# Patient Record
Sex: FEMALE | Race: BLACK OR AFRICAN AMERICAN | NOT HISPANIC OR LATINO | Employment: UNEMPLOYED | URBAN - METROPOLITAN AREA
[De-identification: names, ages, dates, MRNs, and addresses within clinical notes are randomized per-mention and may not be internally consistent; named-entity substitution may affect disease eponyms.]

---

## 2017-05-09 ENCOUNTER — HOSPITAL ENCOUNTER (EMERGENCY)
Facility: HOSPITAL | Age: 22
Discharge: HOME | End: 2017-05-09
Attending: EMERGENCY MEDICINE
Payer: MEDICAID

## 2017-05-09 VITALS
TEMPERATURE: 99 F | SYSTOLIC BLOOD PRESSURE: 104 MMHG | RESPIRATION RATE: 20 BRPM | DIASTOLIC BLOOD PRESSURE: 62 MMHG | OXYGEN SATURATION: 99 % | HEART RATE: 86 BPM

## 2017-05-09 DIAGNOSIS — K52.9 GASTROENTERITIS: Primary | ICD-10-CM

## 2017-05-09 LAB
B-HCG UR QL: NEGATIVE
BILIRUB UR QL STRIP: NEGATIVE
CLARITY UR REFRACT.AUTO: CLEAR
COLOR UR AUTO: YELLOW
GLUCOSE UR QL STRIP: NEGATIVE
HGB UR QL STRIP: NEGATIVE
KETONES UR QL STRIP: NEGATIVE
LEUKOCYTE ESTERASE UR QL STRIP: NEGATIVE
NITRITE UR QL STRIP: NEGATIVE
PH UR STRIP: 8 [PH] (ref 5–8)
PROT UR QL STRIP: NEGATIVE
SP GR UR STRIP: 1.01 (ref 1–1.03)
URN SPEC COLLECT METH UR: NORMAL
UROBILINOGEN UR STRIP-ACNC: 1 EU/DL

## 2017-05-09 PROCEDURE — 81003 URINALYSIS AUTO W/O SCOPE: CPT

## 2017-05-09 PROCEDURE — 99283 EMERGENCY DEPT VISIT LOW MDM: CPT

## 2017-05-09 PROCEDURE — 81025 URINE PREGNANCY TEST: CPT

## 2017-05-09 RX ORDER — ONDANSETRON 4 MG/1
4 TABLET, ORALLY DISINTEGRATING ORAL EVERY 6 HOURS PRN
Qty: 12 TABLET | Refills: 0 | Status: SHIPPED | OUTPATIENT
Start: 2017-05-09 | End: 2018-05-19

## 2017-05-09 NOTE — ED AVS SNAPSHOT
[facility]  [address]  [place] [address]               Asher Brunner   2017  8:06 PM   ED    Description:  Female : 1995   Department:  [facility]           Your Care was Coordinated By:     Provider Role From To    Morena Joseph MD Attending Provider 17 --      Reason for Visit     Abdominal Cramping           Diagnoses this Visit        Comments    Gastroenteritis    -  Primary       ED Disposition     ED Disposition Condition Comment    Discharge             To Do List           Follow-up Information     Follow up with Primary Doctor No In 1 week(s).       These Medications        Disp Refills Start End    ondansetron (ZOFRAN-ODT) 4 MG TbDL 12 tablet 0 2017     Take 1 tablet (4 mg total) by mouth every 6 (six) hours as needed. - Oral      [facility]     [facility] -  Assistance  Unless otherwise directed by your provider, please contact [facility], our nurse care line that is available for  assistance.     Registered nurses in the [facility] provide: appointment scheduling, clinical advisement, health education, and other advisory services.  Call: [phone] (toll free)               Medications           Message regarding Medications     Verify the changes and/or additions to your medication regime listed below are the same as discussed with your clinician today.  If any of these changes or additions are incorrect, please notify your healthcare provider.        START taking these NEW medications        Refills    ondansetron (ZOFRAN-ODT) 4 MG TbDL 0    Sig: Take 1 tablet (4 mg total) by mouth every 6 (six) hours as needed.    Class: Print    Route: Oral           Verify that the below list of medications is an accurate representation of the medications you are currently taking.  If none reported, the list may be blank. If incorrect, please contact your  healthcare provider. Carry this list with you in case of emergency.           Current Medications     alprazolam (XANAX) 0.25 MG tablet Take 1 tablet (0.25 mg total) by mouth 3 (three) times daily as needed for Anxiety.    ondansetron (ZOFRAN-ODT) 4 MG TbDL Take 1 tablet (4 mg total) by mouth every 6 (six) hours as needed.           Clinical Reference Information           Your Vitals Were     BP Pulse Temp Resp Last Period SpO2    104/62 (BP Location: Right arm, Patient Position: Sitting, BP Method: Automatic) 86 98.5 °F (36.9 °C) (Oral) 20 05/07/2017 (Exact Date) 99%      Allergies as of 5/9/2017        Reactions    Codeine Rash      Immunizations Administered on Date of Encounter - 5/9/2017     None      ED Micro, Lab, POCT     Start Ordered       Status Ordering Provider    05/09/17 2017 05/09/17 2016  Urinalysis  STAT      Final result     05/09/17 2017 05/09/17 2016  UPT (Pregnancy, urine rapid)  STAT      Final result       ED Imaging Orders     None        Discharge Instructions           Viral Gastroenteritis (Adult)    Gastroenteritis is commonly called the stomach flu. It is most often caused by a virus that affects the stomach and intestinal tract and usually lasts from 2 to 7 days. Common viruses causing gastroenteritis include norovirus, rotavirus, and hepatitis A. Non-viral causes of gastroenteritis include bacteria, parasites, and toxins.  The danger from repeated vomiting or diarrhea is dehydration. This is the loss of too much fluid from the body. When this occurs, body fluids must be replaced. Antibiotics do not help with this illness because it is usually viral.Simple home treatment will be helpful.  Symptoms of viral gastroenteritis may include:  · Watery, loose stools  · Stomach pain or abdominal cramps  · Fever and chills  · Nausea and vomiting  · Loss of bowel control  · Headache  Home care  Gastroenteritis is transmitted by contact with the stool or vomit of an infected person. This can  occur from person to person or from contact with a contaminated surface.  Follow these guidelines when caring for yourself at home:  · If symptoms are severe, rest at home for the next 24 hours or until you are feeling better.  · Wash your hands with soap and water or use alcohol-based  to prevent the spread of infection. Wash your hands after touching anyone who is sick.  · Wash your hands or use alcohol-based  after using the toilet and before meals. Clean the toilet after each use.  Remember these tips when preparing food:  · People with diarrhea should not prepare or serve food to others. When preparing foods, wash your hands before and after.  · Wash your hands after using cutting boards, countertops, knives, or utensils that have been in contact with raw food.  · Keep uncooked meats away from cooked and ready-to-eat foods.  Medicine  You may use acetaminophen or NSAID medicines like ibuprofen or naproxen to control fever unless another medicine was given. If you have chronic liver or kidney disease, talk with your healthcare provider before using these medicines. Also talk with your provider if you've had a stomach ulcer or gastrointestinal bleeding. Don't give aspirin to anyone under 18 years of age who is ill with a fever. It may cause severe liver damage. Don't use NSAIDS is you are already taking one for another condition (like arthritis) or are on aspirin (such as for heart disease or after a stroke).  If medicine for vomiting or diarrhea are prescribed, take these only as directed. Do not take over-the-counter medicines for vomiting or diarrhea unless instructed by your healthcare provider.  Diet  Follow these guidelines for food:  · Water and liquids are important so you don't get dehydrated. Drink a small amount at a time or suck on ice chips if you are vomiting.  · If you eat, avoid fatty, greasy, spicy, or fried foods.  · Don't eat dairy if you have diarrhea. This can make diarrhea  worse.  · Avoid tobacco, alcohol, and caffeine which may worsen symptoms.  During the first 24 hours (the first full day), follow the diet below:  · Beverages. Sports drinks, soft drinks without caffeine, ginger ale, mineral water (plain or flavored), decaffeinated tea and coffee. If you are very dehydrated, sports drinks aren't a good choice. They have too much sugar and not enough electrolytes. In this case, commercially available products called oral rehydration solutions, are best.  · Soups. Eat clear broth, consommé, and bouillon.  · Desserts. Eat gelatin, popsicles, and fruit juice bars.  During the next 24 hours (the second day), you may add the following to the above:  · Hot cereal, plain toast, bread, rolls, and crackers  · Plain noodles, rice, mashed potatoes, chicken noodle or rice soup  · Unsweetened canned fruit (avoid pineapple), bananas  · Limit fat intake to less than 15 grams per day. Do this by avoiding margarine, butter, oils, mayonnaise, sauces, gravies, fried foods, peanut butter, meat, poultry, and fish.  · Limit fiber and avoid raw or cooked vegetables, fresh fruits (except bananas), and bran cereals.  · Limit caffeine and chocolate. Don't use spices or seasonings other than salt.  · Limit dairy products.  · Avoid alcohol.  During the next 24 hours:  · Gradually resume a normal diet as you feel better and your symptoms improve.  · If at any time it starts getting worse again, go back to clear liquids until you feel better.  Follow-up care  Follow up with your healthcare provider, or as advised. Call your provider if you don't get better within 24 hours or if diarrhea lasts more than a week. Also follow up if you are unable to keep down liquids and get dehydrated. If a stool (diarrhea) sample was taken, call as directed for the results.  Call 911  Call 911 if any of these occur:  · Trouble breathing  · Chest pain  · Confused  · Severe drowsiness or trouble awakening  · Fainting or loss of  consciousness  · Rapid heart rate  · Seizure  · Stiff neck  When to seek medical advice  Call your healthcare provider right away if any of these occur:  · Abdominal pain that gets worse  · Continued vomiting (unable to keep liquids down)  · Frequent diarrhea (more than 5 times a day)  · Blood in vomit or stool (black or red color)  · Dark urine, reduced urine output, or extreme thirst  · Weakness or dizziness  · Drowsiness  · Fever of 100.4°F (38°C) oral or higher that does not get better with fever medicine  · New rash  Date Last Reviewed: 1/3/2016  © 7757-0250 [employer]. [address] [place] [address], [place] [address]. All rights reserved. This information is not intended as a substitute for professional medical care. Always follow your healthcare professional's instructions.           [facility] complies with applicable Federal civil rights laws and does not discriminate on the basis of race, color, national origin, age, disability, or sex.        Language Assistance Services     ATTENTION: Language assistance services are available, free of charge. Please call [phone].      ATENCIÓN: Si habla español, tiene a hernandez disposición servicios gratuitos de asistencia lingüística. Llame al [phone].     MAYCO Ý: N?u b?n nói Ti?ng Vi?t, có các d?ch v? h? tr? ngôn ng? mi?n phí josephh cho b?n. G?i s? [phone].

## 2017-05-10 NOTE — ED TRIAGE NOTES
"Pt states "it seems like a virus but I don't know" pt reports cramping in RUQ x 1 day. Denies N/V/D.  "

## 2017-05-10 NOTE — ED PROVIDER NOTES
"Encounter Date: 5/9/2017       History     Chief Complaint   Patient presents with    Abdominal Cramping     "it seems like a virus but I don't know" pt reports cramping in RUQ x 1 day. Denies N/V/D.     Review of patient's allergies indicates:   Allergen Reactions    Codeine Rash     Patient is a 21 y.o. female presenting with the following complaint: cramps. The history is provided by the patient.   Abdominal Cramping   The primary symptoms of the illness include abdominal pain. The current episode started yesterday. The onset of the illness was gradual.   The abdominal pain began yesterday. The pain came on gradually. The abdominal pain has been unchanged since its onset. The abdominal pain is located in the RLQ. The abdominal pain does not radiate. The severity of the abdominal pain is 2/10. The abdominal pain is relieved by nothing.   The patient states that she believes she is currently not pregnant. The patient has had a change in bowel habit. Symptoms associated with the illness do not include chills, anorexia, diaphoresis, heartburn or constipation. Associated symptoms comments: diarrhea.     History reviewed. No pertinent past medical history.  History reviewed. No pertinent surgical history.  No family history on file.  Social History   Substance Use Topics    Smoking status: Never Smoker    Smokeless tobacco: None    Alcohol use No     Review of Systems   Constitutional: Negative for chills and diaphoresis.   Gastrointestinal: Positive for abdominal pain. Negative for anorexia, constipation and heartburn.   All other systems reviewed and are negative.      Physical Exam   Initial Vitals   BP Pulse Resp Temp SpO2   05/09/17 2013 05/09/17 2013 05/09/17 2013 05/09/17 2013 05/09/17 2013   104/62 86 20 98.5 °F (36.9 °C) 99 %     Physical Exam    Nursing note and vitals reviewed.  Constitutional: She appears well-developed and well-nourished.   HENT:   Head: Normocephalic and atraumatic.   Eyes: EOM are " normal.   Neck: Normal range of motion. Neck supple.   Cardiovascular: Normal rate, regular rhythm, normal heart sounds and intact distal pulses.   Pulmonary/Chest: Breath sounds normal.   Abdominal: Soft. There is no tenderness. There is no rigidity, no rebound, no guarding, no CVA tenderness, no tenderness at McBurney's point and negative Bailon's sign.   Musculoskeletal: Normal range of motion.   Neurological: She is alert and oriented to person, place, and time.   Skin: Skin is warm and dry.   Psychiatric: She has a normal mood and affect. Her behavior is normal. Judgment and thought content normal.         ED Course   Procedures  Labs Reviewed   URINALYSIS   PREGNANCY TEST, URINE RAPID             Medical Decision Making:   Clinical Tests:   Lab Tests: Ordered and Reviewed                   ED Course     Clinical Impression:   The encounter diagnosis was Gastroenteritis.    Disposition:   Disposition: Discharged  Condition: Stable       Morena Joseph MD  05/09/17 9465

## 2017-05-10 NOTE — DISCHARGE INSTRUCTIONS

## 2017-08-07 ENCOUNTER — HOSPITAL ENCOUNTER (EMERGENCY)
Facility: HOSPITAL | Age: 22
Discharge: HOME | End: 2017-08-07
Attending: EMERGENCY MEDICINE
Payer: MEDICAID

## 2017-08-07 VITALS
OXYGEN SATURATION: 100 % | WEIGHT: 105 LBS | SYSTOLIC BLOOD PRESSURE: 111 MMHG | DIASTOLIC BLOOD PRESSURE: 60 MMHG | RESPIRATION RATE: 18 BRPM | TEMPERATURE: 99 F | BODY MASS INDEX: 16.48 KG/M2 | HEART RATE: 70 BPM | HEIGHT: 67 IN

## 2017-08-07 DIAGNOSIS — N30.00 ACUTE CYSTITIS WITHOUT HEMATURIA: Primary | ICD-10-CM

## 2017-08-07 LAB
B-HCG UR QL: NEGATIVE
BACTERIA #/AREA URNS AUTO: ABNORMAL /HPF
BILIRUB UR QL STRIP: NEGATIVE
CLARITY UR REFRACT.AUTO: ABNORMAL
COLOR UR AUTO: YELLOW
CTP QC/QA: YES
GLUCOSE UR QL STRIP: NEGATIVE
HGB UR QL STRIP: ABNORMAL
HYALINE CASTS UR QL AUTO: 0 /LPF
KETONES UR QL STRIP: ABNORMAL
LEUKOCYTE ESTERASE UR QL STRIP: ABNORMAL
MICROSCOPIC COMMENT: ABNORMAL
NITRITE UR QL STRIP: NEGATIVE
PH UR STRIP: 7 [PH] (ref 5–8)
PROT UR QL STRIP: ABNORMAL
RBC #/AREA URNS AUTO: 20 /HPF (ref 0–4)
SP GR UR STRIP: 1.02 (ref 1–1.03)
SQUAMOUS #/AREA URNS AUTO: 1 /HPF
URN SPEC COLLECT METH UR: ABNORMAL
UROBILINOGEN UR STRIP-ACNC: NEGATIVE EU/DL
WBC #/AREA URNS AUTO: >100 /HPF (ref 0–5)

## 2017-08-07 PROCEDURE — 99283 EMERGENCY DEPT VISIT LOW MDM: CPT | Mod: 25

## 2017-08-07 PROCEDURE — 87077 CULTURE AEROBIC IDENTIFY: CPT

## 2017-08-07 PROCEDURE — 87186 SC STD MICRODIL/AGAR DIL: CPT

## 2017-08-07 PROCEDURE — 87086 URINE CULTURE/COLONY COUNT: CPT

## 2017-08-07 PROCEDURE — 81001 URINALYSIS AUTO W/SCOPE: CPT

## 2017-08-07 PROCEDURE — 87088 URINE BACTERIA CULTURE: CPT

## 2017-08-07 PROCEDURE — 81025 URINE PREGNANCY TEST: CPT | Performed by: EMERGENCY MEDICINE

## 2017-08-07 PROCEDURE — 99282 EMERGENCY DEPT VISIT SF MDM: CPT | Mod: ,,, | Performed by: EMERGENCY MEDICINE

## 2017-08-07 RX ORDER — SULFAMETHOXAZOLE AND TRIMETHOPRIM 800; 160 MG/1; MG/1
1 TABLET ORAL 2 TIMES DAILY
Qty: 10 TABLET | Refills: 0 | Status: SHIPPED | OUTPATIENT
Start: 2017-08-07 | End: 2017-08-12

## 2017-08-07 NOTE — ED PROVIDER NOTES
Encounter Date: 8/7/2017    SCRIBE #1 NOTE: IQuang, am scribing for, and in the presence of, Dr. Escobar.       History     Chief Complaint   Patient presents with    Dysuria     x 3 days      Time seen by provider: 2:05 AM    This is a 21 y.o. female no PMH who presents with complaint of dysuria x 3 days. She denies fever, vomiting, flank pain. She states feeling like she is not completely voiding when urinating.       The history is provided by the patient.     Review of patient's allergies indicates:   Allergen Reactions    Codeine Rash     History reviewed. No pertinent past medical history.  No past surgical history on file.  No family history on file.  Social History   Substance Use Topics    Smoking status: Never Smoker    Smokeless tobacco: Not on file    Alcohol use No     Review of Systems   Constitutional: Negative for fever.   HENT: Negative for sore throat.    Respiratory: Negative for shortness of breath.    Cardiovascular: Negative for chest pain.   Gastrointestinal: Negative for nausea.   Genitourinary: Positive for dysuria.   Musculoskeletal: Negative for back pain.   Skin: Negative for rash.   Neurological: Negative for weakness.   Hematological: Does not bruise/bleed easily.       Physical Exam     Initial Vitals [08/07/17 0035]   BP Pulse Resp Temp SpO2   111/60 70 18 98.6 °F (37 °C) 100 %      MAP       77         Physical Exam    Nursing note and vitals reviewed.  Constitutional: She appears well-developed and well-nourished. She is not diaphoretic. No distress.   HENT:   Head: Normocephalic and atraumatic.   Mouth/Throat: Oropharynx is clear and moist.   Eyes: Conjunctivae and EOM are normal. Pupils are equal, round, and reactive to light.   Neck: Normal range of motion. Neck supple. No JVD present.   Cardiovascular: Normal rate, regular rhythm and normal heart sounds.   No murmur heard.  Pulmonary/Chest: Breath sounds normal. No respiratory distress. She has no wheezes. She has no  rhonchi. She has no rales.   Abdominal: Soft. Bowel sounds are normal. She exhibits no distension and no mass. There is no tenderness. There is no rebound and no guarding.   Musculoskeletal: Normal range of motion. She exhibits no edema or tenderness.   Neurological: She is alert and oriented to person, place, and time. She has normal strength. No cranial nerve deficit or sensory deficit.   Skin: Skin is warm and dry. No rash noted.   Psychiatric: She has a normal mood and affect. Thought content normal.         ED Course   Procedures  Labs Reviewed   URINALYSIS, REFLEX TO URINE CULTURE - Abnormal; Notable for the following:        Result Value    Appearance, UA Hazy (*)     Protein, UA 1+ (*)     Ketones, UA 1+ (*)     Occult Blood UA 1+ (*)     Leukocytes, UA 3+ (*)     All other components within normal limits    Narrative:     Preferred Collection Type->Urine, Clean Catch   URINALYSIS MICROSCOPIC - Abnormal; Notable for the following:     RBC, UA 20 (*)     WBC, UA >100 (*)     Bacteria, UA Few (*)     All other components within normal limits    Narrative:     Preferred Collection Type->Urine, Clean Catch   CULTURE, URINE   POCT URINE PREGNANCY             Medical Decision Making:   History:   Old Medical Records: I decided to obtain old medical records.  Initial Assessment:   Pt with dysuria, likely UTI. Will check UA and UPT.             Scribe Attestation:   Scribe #1: I performed the above scribed service and the documentation accurately describes the services I performed. I attest to the accuracy of the note.    Attending Attestation:           Physician Attestation for Scribe:  Physician Attestation Statement for Scribe #1: I, Dr. Escobar, reviewed documentation, as scribed by Quang Flores in my presence, and it is both accurate and complete.                 ED Course     Clinical Impression:   The encounter diagnosis was Acute cystitis without hematuria.                           Abhinav Escobar,  MD  08/07/17 0549

## 2017-08-07 NOTE — ED TRIAGE NOTES
Asher Brunner, a 21 y.o. female presents to the ED c/o burning with urination and hematuria x 2 days.      Chief Complaint   Patient presents with    Dysuria     x 3 days      Review of patient's allergies indicates:   Allergen Reactions    Codeine Rash     History reviewed. No pertinent past medical history.     Adult Physical Assessment  LOC: Asher Brunner, 21 y.o. female verified via two identifiers.  The patient is awake, alert, oriented and speaking appropriately at this time.  APPEARANCE: Patient resting comfortably and appears to be in no acute distress at this time. Patient is clean and well groomed, patient's clothing is properly fastened.  SKIN:The skin is warm and dry, color consistent with ethnicity, patient has normal skin turgor and moist mucus membranes, skin intact, no breakdown or brusing noted.  MUSCULOSKELETAL: Patient moving all extremities well, no obvious swelling or deformities noted.  RESPIRATORY: Airway is open and patent, respirations are spontaneous, patient has a normal effort and rate, no accessory muscle use noted.  CARDIAC: Patient has a normal rate and rhythm, no periphreal edema noted in any extremity, capillary refill < 3 seconds in all extremities  ABDOMEN: Soft and non tender to palpation, no abdominal distention noted. Bowel sounds present in all four quadrants.  NEUROLOGIC: Eyes open spontaneously, behavior appropriate to situation, follows commands, facial expression symmetrical, bilateral hand grasp equal and even, purposeful motor response noted, normal sensation in all extremities when touched with a finger.

## 2017-08-09 LAB — BACTERIA UR CULT: NORMAL

## 2018-01-23 ENCOUNTER — HOSPITAL ENCOUNTER (EMERGENCY)
Facility: HOSPITAL | Age: 23
Discharge: HOME | End: 2018-01-24
Attending: EMERGENCY MEDICINE
Payer: MEDICAID

## 2018-01-23 VITALS
DIASTOLIC BLOOD PRESSURE: 74 MMHG | RESPIRATION RATE: 20 BRPM | HEART RATE: 72 BPM | OXYGEN SATURATION: 98 % | BODY MASS INDEX: 16.45 KG/M2 | SYSTOLIC BLOOD PRESSURE: 121 MMHG | TEMPERATURE: 98 F | WEIGHT: 105 LBS

## 2018-01-23 DIAGNOSIS — N30.01 ACUTE CYSTITIS WITH HEMATURIA: Primary | ICD-10-CM

## 2018-01-23 LAB
B-HCG UR QL: NEGATIVE
BILIRUB UR QL STRIP: NEGATIVE
CLARITY UR REFRACT.AUTO: ABNORMAL
COLOR UR AUTO: ABNORMAL
GLUCOSE UR QL STRIP: NEGATIVE
HGB UR QL STRIP: NEGATIVE
KETONES UR QL STRIP: NEGATIVE
LEUKOCYTE ESTERASE UR QL STRIP: ABNORMAL
NITRITE UR QL STRIP: NEGATIVE
PH UR STRIP: 7 [PH] (ref 5–8)
PROT UR QL STRIP: ABNORMAL
SP GR UR STRIP: 1.02 (ref 1–1.03)
URN SPEC COLLECT METH UR: ABNORMAL
UROBILINOGEN UR STRIP-ACNC: 1 EU/DL

## 2018-01-23 PROCEDURE — 99283 EMERGENCY DEPT VISIT LOW MDM: CPT

## 2018-01-23 PROCEDURE — 81000 URINALYSIS NONAUTO W/SCOPE: CPT

## 2018-01-23 PROCEDURE — 81025 URINE PREGNANCY TEST: CPT

## 2018-01-24 LAB
AMORPH CRY UR QL COMP ASSIST: ABNORMAL
BACTERIA #/AREA URNS AUTO: ABNORMAL /HPF
HYALINE CASTS UR QL AUTO: 1 /LPF
MICROSCOPIC COMMENT: ABNORMAL
RBC #/AREA URNS AUTO: 25 /HPF (ref 0–4)
SQUAMOUS #/AREA URNS AUTO: ABNORMAL /HPF
WBC #/AREA URNS AUTO: 45 /HPF (ref 0–5)

## 2018-01-24 RX ORDER — NITROFURANTOIN 25; 75 MG/1; MG/1
100 CAPSULE ORAL 2 TIMES DAILY
Qty: 20 CAPSULE | Refills: 0 | Status: SHIPPED | OUTPATIENT
Start: 2018-01-24 | End: 2018-02-03

## 2018-01-24 NOTE — ED PROVIDER NOTES
Encounter Date: 1/23/2018       History     Chief Complaint   Patient presents with    Abdominal Pain     abd for 4 days.     The history is provided by the patient.   Abdominal Pain   The current episode started yesterday. The onset of the illness was gradual. The abdominal pain is located in the suprapubic region. The abdominal pain does not radiate. The severity of the abdominal pain is 3/10. The abdominal pain is relieved by nothing.     Review of patient's allergies indicates:   Allergen Reactions    Codeine Rash     History reviewed. No pertinent past medical history.  History reviewed. No pertinent surgical history.  History reviewed. No pertinent family history.  Social History   Substance Use Topics    Smoking status: Never Smoker    Smokeless tobacco: Never Used    Alcohol use No     Review of Systems   Gastrointestinal: Positive for abdominal pain.   All other systems reviewed and are negative.      Physical Exam     Initial Vitals [01/23/18 2342]   BP Pulse Resp Temp SpO2   121/74 72 20 98.2 °F (36.8 °C) 98 %      MAP       89.67         Physical Exam    Nursing note and vitals reviewed.  Constitutional: She appears well-developed and well-nourished.   HENT:   Head: Normocephalic and atraumatic.   Eyes: EOM are normal.   Neck: Normal range of motion. Neck supple.   Cardiovascular: Normal rate, regular rhythm, normal heart sounds and intact distal pulses.   Pulmonary/Chest: Breath sounds normal.   Abdominal: Soft. She exhibits no distension. There is no tenderness. There is no rigidity, no rebound, no guarding, no CVA tenderness, no tenderness at McBurney's point and negative Bailon's sign.   Musculoskeletal: Normal range of motion.   Neurological: She is alert and oriented to person, place, and time.   Skin: Skin is warm and dry. Capillary refill takes less than 2 seconds.   Psychiatric: She has a normal mood and affect. Her behavior is normal. Judgment and thought content normal.         ED Course    Procedures  Labs Reviewed   URINALYSIS - Abnormal; Notable for the following:        Result Value    Appearance, UA Hazy (*)     Protein, UA 1+ (*)     Leukocytes, UA 3+ (*)     All other components within normal limits   URINALYSIS MICROSCOPIC - Abnormal; Notable for the following:     RBC, UA 25 (*)     WBC, UA 45 (*)     Bacteria, UA Moderate (*)     All other components within normal limits   PREGNANCY TEST, URINE RAPID             Medical Decision Making:   Clinical Tests:   Lab Tests: Ordered and Reviewed                   ED Course      Clinical Impression:   The encounter diagnosis was Acute cystitis with hematuria.    Disposition:   Disposition: Discharged  Condition: Stable                        Morena Joseph MD  01/24/18 0039

## 2018-03-25 ENCOUNTER — HOSPITAL ENCOUNTER (EMERGENCY)
Facility: HOSPITAL | Age: 23
Discharge: HOME | End: 2018-03-26
Attending: EMERGENCY MEDICINE
Payer: MEDICAID

## 2018-03-25 VITALS
TEMPERATURE: 98 F | OXYGEN SATURATION: 99 % | HEIGHT: 67 IN | SYSTOLIC BLOOD PRESSURE: 97 MMHG | RESPIRATION RATE: 18 BRPM | DIASTOLIC BLOOD PRESSURE: 55 MMHG | BODY MASS INDEX: 17.27 KG/M2 | HEART RATE: 73 BPM | WEIGHT: 110 LBS

## 2018-03-25 DIAGNOSIS — J06.9 UPPER RESPIRATORY TRACT INFECTION, UNSPECIFIED TYPE: Primary | ICD-10-CM

## 2018-03-25 PROCEDURE — 99283 EMERGENCY DEPT VISIT LOW MDM: CPT

## 2018-03-26 RX ORDER — BENZONATATE 100 MG/1
100 CAPSULE ORAL 3 TIMES DAILY PRN
Qty: 20 CAPSULE | Refills: 0 | Status: SHIPPED | OUTPATIENT
Start: 2018-03-26 | End: 2018-04-05

## 2018-03-26 NOTE — ED NOTES
Pt identifiers Asher Brunner checked and correct    LOC: The patient is awake, alert, aware of environment with an appropriate affect. Oriented x3, speaking appropriately  APPEARANCE: Pt resting comfortably, in no acute distress, pt is clean and well groomed, clothing properly fastened  SKIN: Skin warm, dry and intact, normal skin turgor, moist mucus membranes  RESPIRATORY: Airway is open and patent, respirations are spontaneous, even and unlabored, normal effort and rate. Pt c/o productive cough with yellow sputum, congestion and runny nose. Denies fevers. Clear breath sounds bilaterally throughout.   CARDIAC: Normal rate and rhythm, no peripheral edema noted, capillary refill < 3 seconds, bilateral radial pulses 2+  ABDOMEN: Soft, nontender, nondistended. Bowel sounds present   NEUROLOGIC: PERRL, facial expression is symmetrical, patient moving all extremities spontaneously, normal sensation in all extremities when touched with a finger.  Follows all commands appropriately  MUSCULOSKELETAL: No obvious deformities.

## 2018-03-26 NOTE — ED PROVIDER NOTES
Encounter Date: 3/25/2018       History     Chief Complaint   Patient presents with    Cough     Cough, congestion and rhinitis x 3 days, no other symptoms.      HPI   21 yo female who presents with cough, congestion, rhinorrhea, headaches over the last 3 days. Younger sibling with similar symptoms. Patient requesting work excuse. No other medical complaints at this time.   Review of patient's allergies indicates:   Allergen Reactions    Codeine Rash     History reviewed. No pertinent past medical history.  History reviewed. No pertinent surgical history.  No family history on file.  Social History   Substance Use Topics    Smoking status: Never Smoker    Smokeless tobacco: Never Used    Alcohol use No     Review of Systems   Constitutional: Negative for fever.   HENT: Positive for congestion and rhinorrhea.    Eyes: Negative for visual disturbance.   Respiratory: Negative for shortness of breath.    Cardiovascular: Negative for chest pain.   Skin: Negative for rash.   Neurological: Positive for headaches.       Physical Exam     Initial Vitals [03/25/18 2243]   BP Pulse Resp Temp SpO2   (!) 97/55 73 18 98.2 °F (36.8 °C) 99 %      MAP       69         Physical Exam    Nursing note and vitals reviewed.  Constitutional: She appears well-developed and well-nourished. She is not diaphoretic. No distress.   HENT:   Head: Normocephalic and atraumatic.   Right Ear: Tympanic membrane and external ear normal.   Left Ear: Tympanic membrane and external ear normal.   Mouth/Throat: Uvula is midline and oropharynx is clear and moist. No uvula swelling. No oropharyngeal exudate, posterior oropharyngeal edema, posterior oropharyngeal erythema or tonsillar abscesses.   Eyes: EOM are normal. Pupils are equal, round, and reactive to light.   Neck: Normal range of motion. Neck supple.   Cardiovascular: Normal rate, regular rhythm, normal heart sounds and intact distal pulses. Exam reveals no gallop and no friction rub.    No  murmur heard.  Pulmonary/Chest: Breath sounds normal. No respiratory distress. She has no wheezes. She has no rhonchi. She has no rales.   Musculoskeletal: Normal range of motion.   Neurological: She is alert and oriented to person, place, and time.   Skin: Skin is warm and dry.   Psychiatric: She has a normal mood and affect.         ED Course   Procedures  Labs Reviewed - No data to display                APC / Resident Notes:   23 yo female who presents with URI symptoms and requesting work excuse. No fevers, sob. No indication for labs, imaging at this time. Will discharge with supportive care and PCP f/u.    Ramon Chacon, PGY-2  12:21 AM                   Clinical Impression:   The encounter diagnosis was Upper respiratory tract infection, unspecified type.                           Ramon Chacon MD  Resident  03/26/18 0022

## 2018-03-26 NOTE — ED NOTES
Asher Brunner, a 22 y.o. female presents to the ED intake 2      Chief Complaint   Patient presents with    Cough     Cough, congestion and rhinitis x 3 days, no other symptoms.      Denies fevers. +productive cough with yellow sputum. +HA. Denies taking any over the counter medications.       Review of patient's allergies indicates:   Allergen Reactions    Codeine Rash     History reviewed. No pertinent past medical history.

## 2018-05-19 ENCOUNTER — HOSPITAL ENCOUNTER (EMERGENCY)
Facility: HOSPITAL | Age: 23
Discharge: HOME | End: 2018-05-19
Payer: MEDICAID

## 2018-05-19 VITALS — HEIGHT: 67 IN

## 2018-05-19 DIAGNOSIS — T78.1XXA ALLERGIC REACTION TO FOOD, INITIAL ENCOUNTER: Primary | ICD-10-CM

## 2018-05-19 PROCEDURE — 63600175 PHARM REV CODE 636 W HCPCS: Performed by: NURSE PRACTITIONER

## 2018-05-19 PROCEDURE — 96372 THER/PROPH/DIAG INJ SC/IM: CPT

## 2018-05-19 PROCEDURE — 99283 EMERGENCY DEPT VISIT LOW MDM: CPT | Mod: 25

## 2018-05-19 PROCEDURE — 25000003 PHARM REV CODE 250: Performed by: NURSE PRACTITIONER

## 2018-05-19 RX ORDER — METHYLPREDNISOLONE 4 MG/1
TABLET ORAL
Qty: 1 PACKAGE | Refills: 0 | Status: SHIPPED | OUTPATIENT
Start: 2018-05-19 | End: 2018-06-09

## 2018-05-19 RX ORDER — DIPHENHYDRAMINE HYDROCHLORIDE 50 MG/ML
25 INJECTION INTRAMUSCULAR; INTRAVENOUS
Status: COMPLETED | OUTPATIENT
Start: 2018-05-19 | End: 2018-05-19

## 2018-05-19 RX ORDER — DEXAMETHASONE SODIUM PHOSPHATE 4 MG/ML
8 INJECTION, SOLUTION INTRA-ARTICULAR; INTRALESIONAL; INTRAMUSCULAR; INTRAVENOUS; SOFT TISSUE
Status: COMPLETED | OUTPATIENT
Start: 2018-05-19 | End: 2018-05-19

## 2018-05-19 RX ORDER — FAMOTIDINE 20 MG/1
20 TABLET, FILM COATED ORAL
Status: COMPLETED | OUTPATIENT
Start: 2018-05-19 | End: 2018-05-19

## 2018-05-19 RX ADMIN — FAMOTIDINE 20 MG: 20 TABLET, FILM COATED ORAL at 06:05

## 2018-05-19 RX ADMIN — DIPHENHYDRAMINE HYDROCHLORIDE 25 MG: 50 INJECTION, SOLUTION INTRAMUSCULAR; INTRAVENOUS at 06:05

## 2018-05-19 RX ADMIN — DEXAMETHASONE SODIUM PHOSPHATE 8 MG: 4 INJECTION, SOLUTION INTRAMUSCULAR; INTRAVENOUS at 06:05

## 2018-05-19 NOTE — ED PROVIDER NOTES
"    New Prescriptions    METHYLPREDNISOLONE (MEDROL DOSEPACK) 4 MG TABLET    Take as packet instructs    eMERGENCY dEPARTMENT eNCOUnter    CHIEF COMPLAINT    Chief Complaint   Patient presents with    Rash     Pt c/o rash to BLE, BUE and back x 2 days that comes and goes.  Denies any new products.  Pt states + itching to areas, pt states "it comes and goes."         HPI    Asher Brunner is a 22 y.o. female who presents to the ED with an allergic reaction. States broke out in a rash 2 days ago. Think may have been because she used a spoon that had been used to cook crab with to stir her food and may have cross contaminated it. She states the rash has spread started on face and now is on body. Denies swelling of lips, tongue or throat. Denies trouble breathing or SOB.     CURRENT MEDICATIONS    No current facility-administered medications on file prior to encounter.      Current Outpatient Prescriptions on File Prior to Encounter   Medication Sig Dispense Refill    alprazolam (XANAX) 0.25 MG tablet Take 1 tablet (0.25 mg total) by mouth 3 (three) times daily as needed for Anxiety. 15 tablet 0    [DISCONTINUED] ondansetron (ZOFRAN-ODT) 4 MG TbDL Take 1 tablet (4 mg total) by mouth every 6 (six) hours as needed. 12 tablet 0         ALLERGIES    Review of patient's allergies indicates:   Allergen Reactions    Shellfish containing products Hives     crabmeat    Codeine Rash       PAST MEDICAL HISTORY  History reviewed. No pertinent past medical history.    SURGICAL HISTORY    History reviewed. No pertinent surgical history.    SOCIAL HISTORY    Social History     Social History    Marital status: Single     Spouse name: N/A    Number of children: N/A    Years of education: N/A     Social History Main Topics    Smoking status: Never Smoker    Smokeless tobacco: Never Used    Alcohol use No    Drug use: Yes     Types: Marijuana    Sexual activity: Not Asked     Other Topics Concern    None     Social " "History Narrative    None       FAMILY HISTORY    Family History   Problem Relation Age of Onset    No Known Problems Mother     No Known Problems Father        REVIEW OF SYSTEMS   ROS  Constitutional:  No fever, chills, weight loss or weakness.   Eyes:  No  Photophobia, blurred vision or discharge.   HENT:  No ear pain, nasal congestion or sore throat..  Respiratory:  No cough, shortness of breath or wheezing.   Cardiovascular:  No chest pain, palpitations or swelling.   GI:  No abdominal pain, nausea, vomiting, or diarrhea.  : No dysuria, frequency   Musculoskeletal:  No back pain or neck pain.   Skin:  Reports rash. Reports itching.   Neurologic:  No reported headache.  All Systems otherwise negative except as noted in the History of Present Illness.        PHYSICAL EXAM    Reviewed Triage Note  VITAL SIGNS: Ht 5' 7" (1.702 m)   LMP 05/12/2018  There were no vitals filed for this visit.    Physical Exam  Nursing Notes and Vital Signs Reviewed  Constitutional:  Well-developed, well-nourished, slender, young female in NAD.  HENT:  Normocephalic, atraumatic. Bilateral external EACs normal. Nose normal, no rhinorrhea. Mouth mucus membranes P & M, no oral lesions. Oropharynx no erythema, edema or exudate, uvula midline.   Eyes:  PERRL EOMI. Conjunctiva normal without discharge.   Neck: Normal range of motion. No midline tenderness or vertebral step-off. No stridor. No meningismus. No lymphadenopathy.   Respiratory:  Normal breath sounds bilaterally.  No respiratory distress, retractions, or conversational dyspnea. No wheezing. No rhonchi. No rales. No stridor   Cardiovascular:  Normal heart rate. Normal rhythm. No pitting lower extremity edema.   Integument:  Confluent whelps over face, trunk and extremities.   Neurologic:   Alert and Interactive. MAEW. Gait steady.   Psychiatric:  Affect normal. Mood normal.         LABS  Pertinent labs reviewed. (See chart for details)           RADIOLOGY    Imaging Results  "   None         PROCEDURES    Procedures      EKG         ED COURSE & MEDICAL DECISION MAKING    Pertinent & Imaging studies reviewed. (See chart for details and specific orders.)  NO SOB. No Angioedema. Benadryl, Decadron and Pepcid in ED. Rx for Decadron. Advised to continue OTC Benadryl and Pepcid along with Medrol dose pack. F/u PCP. Return immediately if SOB or any concerns.     Medications   diphenhydrAMINE injection 25 mg (25 mg Intramuscular Given 5/19/18 1835)   dexamethasone injection 8 mg (8 mg Intramuscular Given 5/19/18 1835)   famotidine tablet 20 mg (20 mg Oral Given 5/19/18 1835)           FINAL IMPRESSION    1. Allergic reaction to food, initial encounter        Differential Diagnosis: Anaphylaxis                                       Impetigo    Patient advised to follow-up with PCP for re-check                    Alyssa Aguilar NP  05/19/18 1351

## 2018-05-19 NOTE — ED NOTES
"Pt significant other states he cooked shrimp 3 days ago, states pt has crab allergy but has never had problem with shrimp, pt denies any known shrimp allergy, states "rash" started 2 days ago.  Pt denies any SOB.   "

## 2018-06-28 ENCOUNTER — HOSPITAL ENCOUNTER (EMERGENCY)
Facility: HOSPITAL | Age: 23
Discharge: HOME | End: 2018-06-28
Payer: MEDICAID

## 2018-06-28 VITALS
SYSTOLIC BLOOD PRESSURE: 100 MMHG | TEMPERATURE: 98 F | DIASTOLIC BLOOD PRESSURE: 62 MMHG | BODY MASS INDEX: 16.45 KG/M2 | RESPIRATION RATE: 18 BRPM | WEIGHT: 105 LBS | OXYGEN SATURATION: 100 % | HEART RATE: 88 BPM

## 2018-06-28 DIAGNOSIS — Z20.2 STD EXPOSURE: ICD-10-CM

## 2018-06-28 DIAGNOSIS — J02.9 ACUTE VIRAL PHARYNGITIS: Primary | ICD-10-CM

## 2018-06-28 DIAGNOSIS — F43.0 STRESS REACTION: ICD-10-CM

## 2018-06-28 LAB — DEPRECATED S PYO AG THROAT QL EIA: NEGATIVE

## 2018-06-28 PROCEDURE — 87081 CULTURE SCREEN ONLY: CPT

## 2018-06-28 PROCEDURE — 96372 THER/PROPH/DIAG INJ SC/IM: CPT

## 2018-06-28 PROCEDURE — 99283 EMERGENCY DEPT VISIT LOW MDM: CPT

## 2018-06-28 PROCEDURE — 87880 STREP A ASSAY W/OPTIC: CPT | Mod: 59

## 2018-06-28 PROCEDURE — 63600175 PHARM REV CODE 636 W HCPCS: Performed by: NURSE PRACTITIONER

## 2018-06-28 PROCEDURE — 87147 CULTURE TYPE IMMUNOLOGIC: CPT | Mod: 59

## 2018-06-28 PROCEDURE — 25000003 PHARM REV CODE 250: Performed by: NURSE PRACTITIONER

## 2018-06-28 RX ORDER — AZITHROMYCIN 250 MG/1
TABLET, FILM COATED ORAL
Status: DISCONTINUED
Start: 2018-06-28 | End: 2018-06-28 | Stop reason: HOSPADM

## 2018-06-28 RX ORDER — DEXAMETHASONE SODIUM PHOSPHATE 4 MG/ML
8 INJECTION, SOLUTION INTRA-ARTICULAR; INTRALESIONAL; INTRAMUSCULAR; INTRAVENOUS; SOFT TISSUE
Status: COMPLETED | OUTPATIENT
Start: 2018-06-28 | End: 2018-06-28

## 2018-06-28 RX ORDER — CEFTRIAXONE 1 G/1
1 INJECTION, POWDER, FOR SOLUTION INTRAMUSCULAR; INTRAVENOUS
Status: COMPLETED | OUTPATIENT
Start: 2018-06-28 | End: 2018-06-28

## 2018-06-28 RX ORDER — CEFTRIAXONE 1 G/1
INJECTION, POWDER, FOR SOLUTION INTRAMUSCULAR; INTRAVENOUS
Status: DISCONTINUED
Start: 2018-06-28 | End: 2018-06-28 | Stop reason: HOSPADM

## 2018-06-28 RX ORDER — AZITHROMYCIN 250 MG/1
1000 TABLET, FILM COATED ORAL
Status: COMPLETED | OUTPATIENT
Start: 2018-06-28 | End: 2018-06-28

## 2018-06-28 RX ADMIN — AZITHROMYCIN 1000 MG: 250 TABLET, FILM COATED ORAL at 06:06

## 2018-06-28 RX ADMIN — CEFTRIAXONE SODIUM 1 G: 1 INJECTION, POWDER, FOR SOLUTION INTRAMUSCULAR; INTRAVENOUS at 06:06

## 2018-06-28 RX ADMIN — DEXAMETHASONE SODIUM PHOSPHATE 8 MG: 4 INJECTION, SOLUTION INTRAMUSCULAR; INTRAVENOUS at 06:06

## 2018-06-28 NOTE — ED NOTES
While preparing to administer decadron pt states that she forgot to tell NP that she might have a bacterial infection. Instructed pt to be more specific on why she thought she had this infection. States boyfriend was seen here yesterday and dx with infection of his penis and told her she needed to be checked. Pt having unprotected sex. NP updated and at bedside to re evaluate pt.

## 2018-06-28 NOTE — ED NOTES
C/o rash to arms for several days and sore throat for 3 days. Pt seen here last month for same rash on same area and treated with steroid shot. States rash cleared up but is back again. Unsure what is causing the rash.

## 2018-06-28 NOTE — ED PROVIDER NOTES
Current Discharge Medication List       eMERGENCY dEPARTMENT eNCOUnter    CHIEF COMPLAINT    Chief Complaint   Patient presents with    Rash     rash for 1 month with sore throat for 3 days.    Sore Throat       HPI    Asher Brunner is a 22 y.o. female who presents to the ED with rash that comes and goes for 1 month.  She states that she can get a spot 1 spot that itches and if she scratches it does turn into have and spread.  She states that this happened just a little while ago she but is no longer there.  She further states that she has had a sore throat and it hurts to swallow for the past 3 days.  She denies any sick contact with any new laundry detergents or soaps, foods or drinks.  She does report that she is under a good bit of stress and does get anxious about that at times.     CURRENT MEDICATIONS    No current facility-administered medications on file prior to encounter.      Current Outpatient Prescriptions on File Prior to Encounter   Medication Sig Dispense Refill    alprazolam (XANAX) 0.25 MG tablet Take 1 tablet (0.25 mg total) by mouth 3 (three) times daily as needed for Anxiety. 15 tablet 0         ALLERGIES    Review of patient's allergies indicates:   Allergen Reactions    Shellfish containing products Hives     crabmeat    Codeine Rash       PAST MEDICAL HISTORY  No past medical history on file.    SURGICAL HISTORY    No past surgical history on file.    SOCIAL HISTORY    Social History     Social History    Marital status: Single     Spouse name: N/A    Number of children: N/A    Years of education: N/A     Social History Main Topics    Smoking status: Never Smoker    Smokeless tobacco: Never Used    Alcohol use No    Drug use: Yes     Types: Marijuana    Sexual activity: Not on file     Other Topics Concern    Not on file     Social History Narrative    No narrative on file       FAMILY HISTORY    Family History   Problem Relation Age of Onset    No Known Problems Mother      No Known Problems Father        REVIEW OF SYSTEMS   ROS  Constitutional:  No fever, chills, weight loss or weakness.   Eyes:  No  Photophobia, blurred vision or discharge.   HENT:  No ear pain, nasal congestion, reports sore throat..  Respiratory:  No cough, shortness of breath or wheezing.   Cardiovascular:  No chest pain, palpitations or swelling.   GI:  No abdominal pain, nausea, vomiting, or diarrhea.  : No dysuria, frequency   Musculoskeletal:  No back pain or neck pain.   Skin:  Reports rash.   Neurologic:  No reported headache.  All Systems otherwise negative except as noted in the History of Present Illness.        PHYSICAL EXAM    Reviewed Triage Note  VITAL SIGNS: /62 (BP Location: Right arm, Patient Position: Sitting)   Pulse 88   Temp 98.1 °F (36.7 °C) (Oral)   Resp 18   Wt 47.6 kg (105 lb)   SpO2 100%   BMI 16.45 kg/m²    Vitals:    06/28/18 1743   BP: 100/62   Pulse: 88   Resp: 18   Temp: 98.1 °F (36.7 °C)       Physical Exam  Nursing Notes and Vital Signs Reviewed  Constitutional:  Well-developed, well-nourished, slender young female in NAD.  HENT:  Normocephalic, atraumatic. Bilateral external EACs normal, Bilateral TMs normal. Nose normal, no nasal mucosal edema, no rhinorrhea. Mouth mucus membranes P & M, no oral lesions. Oropharynx with erythema, no edema or exudate, uvula midline.   Eyes:  PERRL EOMI. Conjunctiva normal without discharge.   Neck: Normal range of motion. No midline tenderness or vertebral step-off. No stridor. No meningismus. No lymphadenopathy.   Respiratory:  Normal breath sounds bilaterally.  No respiratory distress, retractions, or conversational dyspnea. No wheezing. No rhonchi. No rales.   Cardiovascular:  Normal heart rate. Normal rhythm. No pitting lower extremity edema.   Integument:  Warm and dry. No rash. No petechiae  Neurologic:   Alert and Interactive. MAEW. Gait steady.   Psychiatric:  Affect normal. Mood normal.         LABS  Pertinent labs  reviewed. (See chart for details)           RADIOLOGY    Imaging Results    None         PROCEDURES    Procedures      EKG         ED COURSE & MEDICAL DECISION MAKING    Pertinent & Imaging studies reviewed. (See chart for details and specific orders.)  Rapid strep negative, no fever or trouble breathing.  No rash in the emergency department.  No angioedema.  Decadron injection in the emergency department.  Advised home with Motrin for sore throat. .  When the nurse went in the room to administer the steroid injection, the patient had been on the phone with her boyfriend and informed the nurse that while she was here that she needed to be checked for a bacterial infection.  She states her boyfriend was here yesterday and was treated for bacterial infection.  When questioned further she said it was down there and he is not circumcised and we had sex without a condom and he wants me to get treated.  She denies any urinary symptoms or discharge.  She was given Rocephin and Zithromax in the ED.    Follow up with primary care or Dermatology for skin issues.    Medications   cefTRIAXone injection 1 g (not administered)   azithromycin tablet 1,000 mg (not administered)   dexamethasone injection 8 mg (8 mg Intramuscular Given 6/28/18 1832)           FINAL IMPRESSION    1. Acute viral pharyngitis    2. Stress reaction    3. STD exposure        Differential Diagnosis:  Allergic reaction                                       Streptococcal pharyngitis                                        Anaphylaxis    Patient advised to follow-up with PCP for re-check                    Alyssa Aguilar NP  06/28/18 8340

## 2018-06-30 LAB
BACTERIA THROAT CULT: NORMAL
BACTERIA THROAT CULT: NORMAL

## 2018-12-29 ENCOUNTER — HOSPITAL ENCOUNTER (OUTPATIENT)
Facility: HOSPITAL | Age: 23
Discharge: HOME | End: 2018-12-31
Attending: EMERGENCY MEDICINE | Admitting: SURGERY
Payer: MEDICAID

## 2018-12-29 DIAGNOSIS — K37 APPENDICITIS: Primary | ICD-10-CM

## 2018-12-29 LAB
ANION GAP SERPL CALC-SCNC: 8 MMOL/L
B-HCG UR QL: NEGATIVE
BACTERIA #/AREA URNS AUTO: NORMAL /HPF
BASOPHILS # BLD AUTO: 0.03 K/UL
BASOPHILS NFR BLD: 0.4 %
BILIRUB UR QL STRIP: NEGATIVE
BUN SERPL-MCNC: 11 MG/DL
CALCIUM SERPL-MCNC: 9.5 MG/DL
CHLORIDE SERPL-SCNC: 103 MMOL/L
CLARITY UR REFRACT.AUTO: CLEAR
CO2 SERPL-SCNC: 26 MMOL/L
COLOR UR AUTO: YELLOW
CREAT SERPL-MCNC: 0.57 MG/DL
DIFFERENTIAL METHOD: ABNORMAL
EOSINOPHIL # BLD AUTO: 0.1 K/UL
EOSINOPHIL NFR BLD: 1 %
ERYTHROCYTE [DISTWIDTH] IN BLOOD BY AUTOMATED COUNT: 13.2 %
EST. GFR  (AFRICAN AMERICAN): >60 ML/MIN/1.73 M^2
EST. GFR  (NON AFRICAN AMERICAN): >60 ML/MIN/1.73 M^2
GLUCOSE SERPL-MCNC: 86 MG/DL
GLUCOSE UR QL STRIP: NEGATIVE
HCT VFR BLD AUTO: 39.1 %
HGB BLD-MCNC: 12.4 G/DL
HGB UR QL STRIP: ABNORMAL
KETONES UR QL STRIP: ABNORMAL
LEUKOCYTE ESTERASE UR QL STRIP: ABNORMAL
LYMPHOCYTES # BLD AUTO: 2.2 K/UL
LYMPHOCYTES NFR BLD: 26.7 %
MCH RBC QN AUTO: 30.7 PG
MCHC RBC AUTO-ENTMCNC: 31.7 G/DL
MCV RBC AUTO: 97 FL
MICROSCOPIC COMMENT: NORMAL
MONOCYTES # BLD AUTO: 0.6 K/UL
MONOCYTES NFR BLD: 7.4 %
NEUTROPHILS # BLD AUTO: 5.2 K/UL
NEUTROPHILS NFR BLD: 64.3 %
NITRITE UR QL STRIP: NEGATIVE
PH UR STRIP: 7 [PH] (ref 5–8)
PLATELET # BLD AUTO: 248 K/UL
PMV BLD AUTO: 11.2 FL
POTASSIUM SERPL-SCNC: 3.8 MMOL/L
PROT UR QL STRIP: NEGATIVE
RBC # BLD AUTO: 4.04 M/UL
RBC #/AREA URNS AUTO: 1 /HPF (ref 0–4)
SODIUM SERPL-SCNC: 137 MMOL/L
SP GR UR STRIP: 1.01 (ref 1–1.03)
URN SPEC COLLECT METH UR: ABNORMAL
UROBILINOGEN UR STRIP-ACNC: 1 EU/DL
WBC # BLD AUTO: 8.1 K/UL
WBC #/AREA URNS AUTO: 2 /HPF (ref 0–5)

## 2018-12-29 PROCEDURE — 63600175 PHARM REV CODE 636 W HCPCS: Performed by: PHYSICIAN ASSISTANT

## 2018-12-29 PROCEDURE — 99285 EMERGENCY DEPT VISIT HI MDM: CPT | Mod: 25

## 2018-12-29 PROCEDURE — 85025 COMPLETE CBC W/AUTO DIFF WBC: CPT

## 2018-12-29 PROCEDURE — 96365 THER/PROPH/DIAG IV INF INIT: CPT

## 2018-12-29 PROCEDURE — 96361 HYDRATE IV INFUSION ADD-ON: CPT

## 2018-12-29 PROCEDURE — 96375 TX/PRO/DX INJ NEW DRUG ADDON: CPT

## 2018-12-29 PROCEDURE — 25000003 PHARM REV CODE 250: Performed by: PHYSICIAN ASSISTANT

## 2018-12-29 PROCEDURE — 99222 PR INITIAL HOSPITAL CARE,LEVL II: ICD-10-PCS | Mod: 57,,, | Performed by: SURGERY

## 2018-12-29 PROCEDURE — 99222 1ST HOSP IP/OBS MODERATE 55: CPT | Mod: 57,,, | Performed by: SURGERY

## 2018-12-29 PROCEDURE — 81025 URINE PREGNANCY TEST: CPT

## 2018-12-29 PROCEDURE — 11000001 HC ACUTE MED/SURG PRIVATE ROOM

## 2018-12-29 PROCEDURE — 80048 BASIC METABOLIC PNL TOTAL CA: CPT

## 2018-12-29 PROCEDURE — 81000 URINALYSIS NONAUTO W/SCOPE: CPT

## 2018-12-29 RX ORDER — HYDROCODONE BITARTRATE AND ACETAMINOPHEN 5; 325 MG/1; MG/1
1 TABLET ORAL EVERY 6 HOURS PRN
Status: DISCONTINUED | OUTPATIENT
Start: 2018-12-29 | End: 2018-12-31 | Stop reason: HOSPADM

## 2018-12-29 RX ORDER — MORPHINE SULFATE 4 MG/ML
4 INJECTION, SOLUTION INTRAMUSCULAR; INTRAVENOUS EVERY 4 HOURS PRN
Status: DISCONTINUED | OUTPATIENT
Start: 2018-12-29 | End: 2018-12-30

## 2018-12-29 RX ORDER — KETOROLAC TROMETHAMINE 30 MG/ML
15 INJECTION, SOLUTION INTRAMUSCULAR; INTRAVENOUS
Status: COMPLETED | OUTPATIENT
Start: 2018-12-29 | End: 2018-12-29

## 2018-12-29 RX ORDER — ONDANSETRON 2 MG/ML
4 INJECTION INTRAMUSCULAR; INTRAVENOUS EVERY 6 HOURS PRN
Status: DISCONTINUED | OUTPATIENT
Start: 2018-12-29 | End: 2018-12-31 | Stop reason: HOSPADM

## 2018-12-29 RX ADMIN — PIPERACILLIN AND TAZOBACTAM 4.5 G: 4; .5 INJECTION, POWDER, LYOPHILIZED, FOR SOLUTION INTRAVENOUS; PARENTERAL at 11:12

## 2018-12-29 RX ADMIN — KETOROLAC TROMETHAMINE 15 MG: 30 INJECTION, SOLUTION INTRAMUSCULAR at 09:12

## 2018-12-29 RX ADMIN — SODIUM CHLORIDE 1000 ML: 0.9 INJECTION, SOLUTION INTRAVENOUS at 09:12

## 2018-12-30 ENCOUNTER — ANESTHESIA (OUTPATIENT)
Dept: SURGERY | Facility: HOSPITAL | Age: 23
End: 2018-12-30
Payer: MEDICAID

## 2018-12-30 ENCOUNTER — ANESTHESIA (OUTPATIENT)
Dept: SURGERY | Facility: HOSPITAL | Age: 23
End: 2018-12-30

## 2018-12-30 ENCOUNTER — ANESTHESIA EVENT (OUTPATIENT)
Dept: SURGERY | Facility: HOSPITAL | Age: 23
End: 2018-12-30
Payer: MEDICAID

## 2018-12-30 ENCOUNTER — ANESTHESIA EVENT (OUTPATIENT)
Dept: SURGERY | Facility: HOSPITAL | Age: 23
End: 2018-12-30

## 2018-12-30 PROCEDURE — 25000003 PHARM REV CODE 250: Performed by: SURGERY

## 2018-12-30 PROCEDURE — 27201423 OPTIME MED/SURG SUP & DEVICES STERILE SUPPLY: Performed by: SURGERY

## 2018-12-30 PROCEDURE — 71000033 HC RECOVERY, INTIAL HOUR: Performed by: SURGERY

## 2018-12-30 PROCEDURE — 25000003 PHARM REV CODE 250: Performed by: STUDENT IN AN ORGANIZED HEALTH CARE EDUCATION/TRAINING PROGRAM

## 2018-12-30 PROCEDURE — 11000001 HC ACUTE MED/SURG PRIVATE ROOM

## 2018-12-30 PROCEDURE — 87205 SMEAR GRAM STAIN: CPT

## 2018-12-30 PROCEDURE — 00840 ANES IPER PX LOWER ABD NOS: CPT | Performed by: SURGERY

## 2018-12-30 PROCEDURE — 36000709 HC OR TIME LEV III EA ADD 15 MIN: Performed by: SURGERY

## 2018-12-30 PROCEDURE — 88304 TISSUE SPECIMEN TO PATHOLOGY - SURGERY: ICD-10-PCS | Mod: 26,,, | Performed by: PATHOLOGY

## 2018-12-30 PROCEDURE — 36000708 HC OR TIME LEV III 1ST 15 MIN: Performed by: SURGERY

## 2018-12-30 PROCEDURE — 88304 TISSUE EXAM BY PATHOLOGIST: CPT | Mod: 26,,, | Performed by: PATHOLOGY

## 2018-12-30 PROCEDURE — 63600175 PHARM REV CODE 636 W HCPCS: Performed by: ANESTHESIOLOGY

## 2018-12-30 PROCEDURE — 37000009 HC ANESTHESIA EA ADD 15 MINS: Performed by: SURGERY

## 2018-12-30 PROCEDURE — 87070 CULTURE OTHR SPECIMN AEROBIC: CPT

## 2018-12-30 PROCEDURE — 63600175 PHARM REV CODE 636 W HCPCS: Performed by: STUDENT IN AN ORGANIZED HEALTH CARE EDUCATION/TRAINING PROGRAM

## 2018-12-30 PROCEDURE — 94761 N-INVAS EAR/PLS OXIMETRY MLT: CPT | Mod: 59

## 2018-12-30 PROCEDURE — 63600175 PHARM REV CODE 636 W HCPCS: Performed by: SURGERY

## 2018-12-30 PROCEDURE — 88304 TISSUE EXAM BY PATHOLOGIST: CPT | Performed by: PATHOLOGY

## 2018-12-30 PROCEDURE — S0020 INJECTION, BUPIVICAINE HYDRO: HCPCS | Performed by: SURGERY

## 2018-12-30 PROCEDURE — 44970 PR LAP,APPENDECTOMY: ICD-10-PCS | Mod: ,,, | Performed by: SURGERY

## 2018-12-30 PROCEDURE — 37000008 HC ANESTHESIA 1ST 15 MINUTES: Performed by: SURGERY

## 2018-12-30 PROCEDURE — G0378 HOSPITAL OBSERVATION PER HR: HCPCS

## 2018-12-30 PROCEDURE — 87075 CULTR BACTERIA EXCEPT BLOOD: CPT

## 2018-12-30 PROCEDURE — 44970 LAPAROSCOPY APPENDECTOMY: CPT | Mod: ,,, | Performed by: SURGERY

## 2018-12-30 RX ORDER — ACETAMINOPHEN 10 MG/ML
INJECTION, SOLUTION INTRAVENOUS
Status: DISCONTINUED | OUTPATIENT
Start: 2018-12-30 | End: 2018-12-30

## 2018-12-30 RX ORDER — FENTANYL CITRATE 50 UG/ML
INJECTION, SOLUTION INTRAMUSCULAR; INTRAVENOUS
Status: DISCONTINUED | OUTPATIENT
Start: 2018-12-30 | End: 2018-12-30

## 2018-12-30 RX ORDER — ONDANSETRON 2 MG/ML
4 INJECTION INTRAMUSCULAR; INTRAVENOUS DAILY PRN
Status: DISCONTINUED | OUTPATIENT
Start: 2018-12-30 | End: 2018-12-30 | Stop reason: HOSPADM

## 2018-12-30 RX ORDER — ROCURONIUM BROMIDE 10 MG/ML
INJECTION, SOLUTION INTRAVENOUS
Status: DISCONTINUED | OUTPATIENT
Start: 2018-12-30 | End: 2018-12-30

## 2018-12-30 RX ORDER — SODIUM CHLORIDE 9 MG/ML
INJECTION, SOLUTION INTRAVENOUS CONTINUOUS
Status: CANCELLED | OUTPATIENT
Start: 2018-12-30

## 2018-12-30 RX ORDER — SODIUM CHLORIDE, SODIUM LACTATE, POTASSIUM CHLORIDE, CALCIUM CHLORIDE 600; 310; 30; 20 MG/100ML; MG/100ML; MG/100ML; MG/100ML
INJECTION, SOLUTION INTRAVENOUS CONTINUOUS PRN
Status: DISCONTINUED | OUTPATIENT
Start: 2018-12-30 | End: 2018-12-30

## 2018-12-30 RX ORDER — KETOROLAC TROMETHAMINE 30 MG/ML
INJECTION, SOLUTION INTRAMUSCULAR; INTRAVENOUS
Status: DISCONTINUED | OUTPATIENT
Start: 2018-12-30 | End: 2018-12-30

## 2018-12-30 RX ORDER — MORPHINE SULFATE 4 MG/ML
2 INJECTION, SOLUTION INTRAMUSCULAR; INTRAVENOUS EVERY 4 HOURS PRN
Status: DISCONTINUED | OUTPATIENT
Start: 2018-12-30 | End: 2018-12-31 | Stop reason: HOSPADM

## 2018-12-30 RX ORDER — SODIUM CHLORIDE, SODIUM LACTATE, POTASSIUM CHLORIDE, CALCIUM CHLORIDE 600; 310; 30; 20 MG/100ML; MG/100ML; MG/100ML; MG/100ML
INJECTION, SOLUTION INTRAVENOUS CONTINUOUS
Status: DISCONTINUED | OUTPATIENT
Start: 2018-12-30 | End: 2018-12-30

## 2018-12-30 RX ORDER — GLYCOPYRROLATE 0.2 MG/ML
INJECTION INTRAMUSCULAR; INTRAVENOUS
Status: DISCONTINUED | OUTPATIENT
Start: 2018-12-30 | End: 2018-12-30

## 2018-12-30 RX ORDER — ONDANSETRON 2 MG/ML
INJECTION INTRAMUSCULAR; INTRAVENOUS
Status: DISCONTINUED | OUTPATIENT
Start: 2018-12-30 | End: 2018-12-30

## 2018-12-30 RX ORDER — FENTANYL CITRATE 50 UG/ML
25 INJECTION, SOLUTION INTRAMUSCULAR; INTRAVENOUS EVERY 5 MIN PRN
Status: CANCELLED | OUTPATIENT
Start: 2018-12-30

## 2018-12-30 RX ORDER — NEOSTIGMINE METHYLSULFATE 1 MG/ML
INJECTION, SOLUTION INTRAVENOUS
Status: DISCONTINUED | OUTPATIENT
Start: 2018-12-30 | End: 2018-12-30

## 2018-12-30 RX ORDER — SUCCINYLCHOLINE CHLORIDE 20 MG/ML
INJECTION INTRAMUSCULAR; INTRAVENOUS
Status: DISCONTINUED | OUTPATIENT
Start: 2018-12-30 | End: 2018-12-30

## 2018-12-30 RX ORDER — BUPIVACAINE HYDROCHLORIDE 5 MG/ML
INJECTION, SOLUTION EPIDURAL; INTRACAUDAL
Status: DISCONTINUED | OUTPATIENT
Start: 2018-12-30 | End: 2018-12-30 | Stop reason: HOSPADM

## 2018-12-30 RX ORDER — HYDROMORPHONE HYDROCHLORIDE 2 MG/ML
0.25 INJECTION, SOLUTION INTRAMUSCULAR; INTRAVENOUS; SUBCUTANEOUS EVERY 5 MIN PRN
Status: DISCONTINUED | OUTPATIENT
Start: 2018-12-30 | End: 2018-12-30 | Stop reason: HOSPADM

## 2018-12-30 RX ORDER — PHENYLEPHRINE HYDROCHLORIDE 10 MG/ML
INJECTION INTRAVENOUS
Status: DISCONTINUED | OUTPATIENT
Start: 2018-12-30 | End: 2018-12-30

## 2018-12-30 RX ORDER — SODIUM CHLORIDE 0.9 % (FLUSH) 0.9 %
3 SYRINGE (ML) INJECTION
Status: DISCONTINUED | OUTPATIENT
Start: 2018-12-30 | End: 2018-12-31 | Stop reason: HOSPADM

## 2018-12-30 RX ORDER — KETOROLAC TROMETHAMINE 30 MG/ML
15 INJECTION, SOLUTION INTRAMUSCULAR; INTRAVENOUS EVERY 8 HOURS PRN
Status: CANCELLED | OUTPATIENT
Start: 2018-12-30 | End: 2019-01-01

## 2018-12-30 RX ORDER — SIMETHICONE 125 MG
125 TABLET,CHEWABLE ORAL EVERY 6 HOURS PRN
Status: DISCONTINUED | OUTPATIENT
Start: 2018-12-30 | End: 2018-12-31 | Stop reason: HOSPADM

## 2018-12-30 RX ORDER — LIDOCAINE HCL/PF 100 MG/5ML
SYRINGE (ML) INTRAVENOUS
Status: DISCONTINUED | OUTPATIENT
Start: 2018-12-30 | End: 2018-12-30

## 2018-12-30 RX ORDER — SODIUM CHLORIDE 0.9 % (FLUSH) 0.9 %
3 SYRINGE (ML) INJECTION EVERY 8 HOURS
Status: CANCELLED | OUTPATIENT
Start: 2018-12-30

## 2018-12-30 RX ORDER — HYDROMORPHONE HYDROCHLORIDE 2 MG/ML
0.5 INJECTION, SOLUTION INTRAMUSCULAR; INTRAVENOUS; SUBCUTANEOUS EVERY 5 MIN PRN
Status: COMPLETED | OUTPATIENT
Start: 2018-12-30 | End: 2018-12-30

## 2018-12-30 RX ORDER — SODIUM CHLORIDE 0.9 % (FLUSH) 0.9 %
3 SYRINGE (ML) INJECTION EVERY 8 HOURS
Status: DISCONTINUED | OUTPATIENT
Start: 2018-12-30 | End: 2018-12-30 | Stop reason: HOSPADM

## 2018-12-30 RX ORDER — ACETAMINOPHEN 10 MG/ML
1000 INJECTION, SOLUTION INTRAVENOUS ONCE
Status: DISCONTINUED | OUTPATIENT
Start: 2018-12-30 | End: 2018-12-31 | Stop reason: HOSPADM

## 2018-12-30 RX ORDER — CEFAZOLIN SODIUM 1 G/3ML
INJECTION, POWDER, FOR SOLUTION INTRAMUSCULAR; INTRAVENOUS
Status: DISCONTINUED | OUTPATIENT
Start: 2018-12-30 | End: 2018-12-30

## 2018-12-30 RX ADMIN — ACETAMINOPHEN 1000 MG: 10 INJECTION, SOLUTION INTRAVENOUS at 04:12

## 2018-12-30 RX ADMIN — SODIUM CHLORIDE, SODIUM LACTATE, POTASSIUM CHLORIDE, AND CALCIUM CHLORIDE: .6; .31; .03; .02 INJECTION, SOLUTION INTRAVENOUS at 04:12

## 2018-12-30 RX ADMIN — PIPERACILLIN AND TAZOBACTAM 4.5 G: 4; .5 INJECTION, POWDER, LYOPHILIZED, FOR SOLUTION INTRAVENOUS; PARENTERAL at 08:12

## 2018-12-30 RX ADMIN — HYDROMORPHONE HYDROCHLORIDE 0.5 MG: 2 INJECTION INTRAMUSCULAR; INTRAVENOUS; SUBCUTANEOUS at 05:12

## 2018-12-30 RX ADMIN — SIMETHICONE 125 MG: 125 TABLET, CHEWABLE ORAL at 10:12

## 2018-12-30 RX ADMIN — LIDOCAINE HYDROCHLORIDE 100 MG: 20 INJECTION, SOLUTION INTRAVENOUS at 04:12

## 2018-12-30 RX ADMIN — ROCURONIUM BROMIDE 5 MG: 10 INJECTION, SOLUTION INTRAVENOUS at 04:12

## 2018-12-30 RX ADMIN — ONDANSETRON 8 MG: 2 INJECTION, SOLUTION INTRAMUSCULAR; INTRAVENOUS at 04:12

## 2018-12-30 RX ADMIN — GLYCOPYRROLATE 0.6 MG: 0.2 INJECTION, SOLUTION INTRAMUSCULAR; INTRAVENOUS at 04:12

## 2018-12-30 RX ADMIN — SUCCINYLCHOLINE CHLORIDE 120 MG: 20 INJECTION, SOLUTION INTRAMUSCULAR; INTRAVENOUS at 04:12

## 2018-12-30 RX ADMIN — CEFAZOLIN 2 G: 330 INJECTION, POWDER, FOR SOLUTION INTRAMUSCULAR; INTRAVENOUS at 04:12

## 2018-12-30 RX ADMIN — NEOSTIGMINE METHYLSULFATE 5 MG: 1 INJECTION INTRAVENOUS at 04:12

## 2018-12-30 RX ADMIN — FENTANYL CITRATE 50 MCG: 50 INJECTION, SOLUTION INTRAMUSCULAR; INTRAVENOUS at 05:12

## 2018-12-30 RX ADMIN — FENTANYL CITRATE 100 MCG: 50 INJECTION, SOLUTION INTRAMUSCULAR; INTRAVENOUS at 04:12

## 2018-12-30 RX ADMIN — KETOROLAC TROMETHAMINE 30 MG: 30 INJECTION, SOLUTION INTRAMUSCULAR; INTRAVENOUS at 04:12

## 2018-12-30 RX ADMIN — SODIUM CHLORIDE, SODIUM LACTATE, POTASSIUM CHLORIDE, AND CALCIUM CHLORIDE: .6; .31; .03; .02 INJECTION, SOLUTION INTRAVENOUS at 08:12

## 2018-12-30 RX ADMIN — PHENYLEPHRINE HYDROCHLORIDE 100 MCG: 10 INJECTION INTRAVENOUS at 04:12

## 2018-12-30 RX ADMIN — ROCURONIUM BROMIDE 25 MG: 10 INJECTION, SOLUTION INTRAVENOUS at 04:12

## 2018-12-30 RX ADMIN — FENTANYL CITRATE 50 MCG: 50 INJECTION, SOLUTION INTRAMUSCULAR; INTRAVENOUS at 04:12

## 2018-12-30 RX ADMIN — PHENYLEPHRINE HYDROCHLORIDE 100 MCG: 10 INJECTION INTRAVENOUS at 05:12

## 2018-12-30 NOTE — PLAN OF CARE
Pt meets PACU discharge criteria. Pt signed out of PACU by Dr Vergara. Report called to ИВАН Palma

## 2018-12-30 NOTE — PLAN OF CARE
Pt arrived to PACU room 265 with MARILU Jalloh RN and Dr Restrepo. Pt sleeping but arouses to voice. vss (see flow sheet) respirations even and unlabored. Incisional sites x 3 closed with dermabond c/d/i. Pt's mother updated via text system

## 2018-12-30 NOTE — ANESTHESIA PREPROCEDURE EVALUATION
12/30/2018  Asher Brunner is a 23 y.o., female.    Anesthesia Evaluation    I have reviewed the Patient Summary Reports.    I have reviewed the Nursing Notes.   I have reviewed the Medications.     Review of Systems      Physical Exam  General:  Well nourished    Airway/Jaw/Neck:  Airway Findings: Mouth Opening: Normal Tongue: Normal  General Airway Assessment: Adult  Mallampati: II  Improves to II with phonation.  TM Distance: Normal, at least 6 cm                 Anesthesia Plan  Type of Anesthesia, risks & benefits discussed:  Anesthesia Type:  general  Patient's Preference:   Intra-op Monitoring Plan:   Intra-op Monitoring Plan Comments:   Post Op Pain Control Plan: multimodal analgesia  Post Op Pain Control Plan Comments:   Induction:   IV  Beta Blocker:  Patient is not currently on a Beta-Blocker (No further documentation required).       Informed Consent: Patient understands risks and agrees with Anesthesia plan.  Questions answered. Anesthesia consent signed with patient.  ASA Score: 2  emergent   Day of Surgery Review of History & Physical: I have interviewed and examined the patient. I have reviewed the patient's H&P dated:            Ready For Surgery From Anesthesia Perspective.

## 2018-12-30 NOTE — OP NOTE
DATE OF PROCEDURE: 12/30/2018    PREOPERATIVE DIAGNOSIS:  Acute appendicitis    POSTOPERATIVE DIAGNOSIS:  Normal appendix    PROCEDURE: Laparoscopic appendectomy    SURGEON: Dmitry Navarro M.D    ASSISTANT: None    ANESTHESIA: General endotracheal    ESTIMATED BLOOD LOSS:  Minimal    SPECIMEN:   1.  Appendix  2.  Peritoneal fluid for culture    CONDITION: Stable    COMPLICATIONS: None    FINDINGS:   1) appendix grossly normal, appendectomy performed  2) turbid peritoneal fluid present, culture taken  3) uterus mildly inflamed, possibly from irritation from fluid, ovaries appeared normal with just a small simple cyst identified on the left  4.  Gallbladder distended however no obvious inflammation present.  5.  Remainder of the abdominal exam was grossly normal.  There is no thickening, inflammation or fat creeping of the small bowel.  The liver appeared normal.  Colon appeared grossly normal.    INDICATIONS: The patient is a 23 y.o. female who presented to the ER with ~12 hours of abd pain that localized to the RLQ. Work up including ultrasound scan demonstrated suspeced uncomplicated appendicitis. The patient was seen and treatment options including surgery versus antibiotic therapy were discussed. After R/B/A were reviewed the patient elected to proceed with laparoscopic appendectomy. The patient demonstrated understanding of these risks and a consent form was obtained.    PROCEDURE IN DETAIL: The patient was identified in the Preoperative Unit and taken back to the Operating Room and laid supine on the operating room table. IV antibiotics were recently administered. General anesthesia was induced without complication. A kline was placed to decompress the bladder. The patient was then prepped and draped in the standard sterile fashion. A tmeout was performed in accordance with hospital protocol. A 1 cm incision was sharply made infraumbilically after injecting local anesthetic.  An Optiview trocar with a 5 mm  0 degree scope was inserted into the peritoneal cavity. We then attached insufflation to achieve pneumoperitoneum.  The insertion site was inspected with no obvious injury. An 11 mm port was then placed in the left lower quadrant after injecting local anesthetic.  A 5 mm port was placed in the suprapubic region after injecting local anesthetic.  The abdomen was inspected. The RLQ and pelvis showed some inflammatory fluid but the appendix appeared grossly normal. The uterus was somewhat injected but not significantly inflamed.  The ovaries did not appear twisted and there is no obvious abscess.  The left side did have a simple cyst present. The cultures taken of the abdominal fluid. The small bowel and colon appeared normal without fat creeping or inflammation. The gallbladder was distended but not inflamed.  The liver appeared normal.  The appendix was identified and found to be grossly normal versus possible mild dilatation.  There is no evidence of necrosis or perforation.  Despite the appendix not being obviously the source of her abdominal pain we elected to perform appendectomy regardless.  A window was created through the mesoappendix. The appendix was then transected at its base with a blue load stapler. The mesoappendix was transected with a white load stapler. The appendix was then placed in an endocath bag and removed from the umbilical port site. The cecum was inspected and the staple lines visualized. Hemostasis was present. The ports were then removed and the abdomen desulfated. An 0-Vicryl fascia stitch was placed at the left lower quadrant incision. The incisions were then injected with additional local anesthetic. Deep dermal 3-0 Vicryls were placed interrupted fashion at the left lower quadrant incision and then the skin incisions were closed using 4-0 Vicryl suture . Dermabond was then applied. The kline was removed, the patient was awakened from general anesthesia without complication and then  returned to the Postoperative Recovery Unit in stable condition. At the end of the case, sponge, instrument and needle counts were correct x 2. I was present and scrubbed throughout the entirety of the case.    Dmitry Navarro Jr

## 2018-12-30 NOTE — ANESTHESIA POSTPROCEDURE EVALUATION
"Anesthesia Post Evaluation    Patient: Asher Brunner    Procedure(s) Performed: Procedure(s) (LRB):  APPENDECTOMY, LAPAROSCOPIC (N/A)    Final Anesthesia Type: general  Patient location during evaluation: PACU  Patient participation: Yes- Able to Participate  Level of consciousness: awake and alert, awake and oriented  Post-procedure vital signs: reviewed and stable  Pain management: adequate  Airway patency: patent  PONV status at discharge: No PONV  Anesthetic complications: no      Cardiovascular status: blood pressure returned to baseline and hemodynamically stable  Respiratory status: unassisted, spontaneous ventilation and room air  Hydration status: euvolemic  Follow-up not needed.        Visit Vitals  /64   Pulse 70   Temp 36.5 °C (97.7 °F) (Skin)   Resp 18   Ht 5' 8" (1.727 m)   Wt 52 kg (114 lb 10.2 oz)   LMP 12/25/2018 (Approximate)   SpO2 97%   Breastfeeding? No   BMI 17.43 kg/m²       Pain/[place] Score: Pain Rating Prior to Med Admin: 4 (12/30/2018  5:38 PM)  Pain Rating Post Med Admin: 3 (12/29/2018  9:32 PM)  [place]anna Score: 9 (12/30/2018  5:35 PM)        "

## 2018-12-30 NOTE — ANESTHESIA RELEASE NOTE
"Anesthesia Release from PACU Note    Patient: Asher Brunner    Procedure(s) Performed: Procedure(s) (LRB):  APPENDECTOMY, LAPAROSCOPIC (N/A)    Anesthesia type: general    Post pain: Adequate analgesia    Post assessment: no apparent anesthetic complications    Last Vitals:   Visit Vitals  /64   Pulse 70   Temp 36.5 °C (97.7 °F) (Skin)   Resp 18   Ht 5' 8" (1.727 m)   Wt 52 kg (114 lb 10.2 oz)   LMP 12/25/2018 (Approximate)   SpO2 97%   Breastfeeding? No   BMI 17.43 kg/m²       Post vital signs: stable    Level of consciousness: awake    Nausea/Vomiting: no nausea/no vomiting    Complications: none    Airway Patency: patent    Respiratory: unassisted, spontaneous ventilation, room air    Cardiovascular: stable and blood pressure at baseline    Hydration: euvolemic  "

## 2018-12-30 NOTE — ED PROVIDER NOTES
Encounter Date: 12/29/2018       History     Chief Complaint   Patient presents with    Pelvic Pain     started yesterday. Pain with urination.      Asher Brunner, a 23 y.o. female that presents to the ED for evaluation of pelvic pain.  Patient states that the pain started yesterday.  She's had associated symptoms of decreased appetite.  Patient states that she has pain to her lower abdomen with urination, but not burning, increased frequency or urgency.  No treatments tried PTA.  Pain started to the left side of her pelvis yesterday and has migrated to her right side today.  No previous abdominal surgeries.  LMP was 12/24/18.  Patient states she was recently tested about 3 weeks ago for STDs with a negative test and has no concern.  No vaginal discharge, odor or bleeding.            The history is provided by the patient.     Review of patient's allergies indicates:   Allergen Reactions    Shellfish containing products Hives     crabmeat    Codeine Rash     History reviewed. No pertinent past medical history.  History reviewed. No pertinent surgical history.  Family History   Problem Relation Age of Onset    No Known Problems Mother     No Known Problems Father      Social History     Tobacco Use    Smoking status: Never Smoker    Smokeless tobacco: Never Used   Substance Use Topics    Alcohol use: No    Drug use: Yes     Types: Marijuana     Review of Systems   Constitutional: Positive for appetite change (decrease). Negative for fever.   Gastrointestinal: Positive for abdominal pain and nausea.   Genitourinary: Positive for pelvic pain. Negative for dysuria, frequency, vaginal bleeding, vaginal discharge and vaginal pain.   Skin: Negative for color change.   Hematological: Negative for adenopathy.   Psychiatric/Behavioral: Negative for agitation.   All other systems reviewed and are negative.      Physical Exam     Initial Vitals [12/29/18 1933]   BP Pulse Resp Temp SpO2   112/71 90 18 97.8 °F (36.6  °C) 100 %      MAP       --         Physical Exam    Nursing note and vitals reviewed.  Constitutional: She appears well-developed and well-nourished. She is not diaphoretic. No distress.   HENT:   Head: Normocephalic and atraumatic.   Right Ear: External ear normal.   Left Ear: External ear normal.   Nose: Nose normal.   Mouth/Throat: Oropharynx is clear and moist.   Eyes: Conjunctivae and EOM are normal.   Neck: Normal range of motion.   Cardiovascular: Normal rate and regular rhythm.   Pulmonary/Chest: Breath sounds normal. No respiratory distress. She has no wheezes. She has no rhonchi. She has no rales.   Abdominal: Soft. Bowel sounds are normal. She exhibits no distension. There is tenderness in the right lower quadrant and left lower quadrant. There is no rigidity, no rebound, no guarding, no CVA tenderness, no tenderness at McBurney's point and negative Bailon's sign.   + Rovings sign, Negative Obturator and Psoas    Musculoskeletal: Normal range of motion.   Neurological: She is alert and oriented to person, place, and time.   Skin: Skin is warm and dry. Capillary refill takes less than 2 seconds. No rash noted. No erythema.   Psychiatric: She has a normal mood and affect. Thought content normal.         ED Course   Procedures  Labs Reviewed   URINALYSIS, REFLEX TO URINE CULTURE - Abnormal; Notable for the following components:       Result Value    Ketones, UA 2+ (*)     Occult Blood UA Trace (*)     Leukocytes, UA 1+ (*)     All other components within normal limits    Narrative:     Preferred Collection Type->Urine, Clean Catch   CBC W/ AUTO DIFFERENTIAL - Abnormal; Notable for the following components:    MCHC 31.7 (*)     All other components within normal limits   PREGNANCY TEST, URINE RAPID   URINALYSIS MICROSCOPIC    Narrative:     Preferred Collection Type->Urine, Clean Catch   BASIC METABOLIC PANEL          Imaging Results          US Soft Tissue Misc (Final result)  Result time 12/29/18 22:07:32    Procedure changed from US Abdomen Limited     Final result by Dion Alva MD (12/29/18 22:07:32)                 Impression:      Findings suspicious for acute appendicitis.      Electronically signed by: Dion Alva MD  Date:    12/29/2018  Time:    22:07             Narrative:    EXAMINATION:  [place] [facility]    CLINICAL HISTORY:  RLQ pain;    COMPARISON:  None    FINDINGS:  A prominent, tubular structure measures 0.9 cm in maximal dimension.  Internal debris or fluid noted within the tubular structure.  Finding is suspicious for an acute appendicitis.                                 Medical Decision Making:   Initial Assessment:   RLQ abdominal pain   Differential Diagnosis:   Appendicitis, ovarian torsion, ovarian cyst  Clinical Tests:   Lab Tests: Ordered and Reviewed  The following lab test(s) were unremarkable: CBC, BMP, Urinalysis and UPT  Radiological Study: Ordered and Reviewed  ED Management:  Patient presents to the ER for evaluation of right lower quadrant pain. Positive Rovsing sign. UA not convincing for a developing UTI.  White blood cell count was 8000.  Ultrasound findings are concerning for acute appendicitis.  Spoke with Dr. Navarro with general surgery at [facility] and patient is accepted to that facility.  Given instructions to make NPO and to give zoysn for prophylactic antibiotics for surgery min day tomorrow.              Attending Attestation:     Physician Attestation Statement for NP/PA:   I have conducted a face to face encounter with this patient in addition to the NP/PA, due to NP/PA Request    Other NP/PA Attestation Additions:    History of Present Illness: 23-year-old female, with lower abdominal pain. Started on left, then migrated to the right.  Has tenderness in the right lower quadrant.  The ultrasound suspicious for acute appendicitis.  She is being transferred to [facility] for evaluation by General surgery.  She was started on Zosyn.                       Clinical Impression:   The encounter diagnosis was Appendicitis.      Disposition:   Disposition: Placed in Observation  Condition: Stable                        Rosie Prasad PA-C  12/29/18 5611       Dion Green MD  12/29/18 1224

## 2018-12-30 NOTE — NURSING
Patient arrived via stretcher at 2415. No extreme pain noted. Patient ambulated. Plan of care reviewed with patient and Boyfriend who remained at the bedside. Patient remained NPO. Will continue to monitor.

## 2018-12-30 NOTE — H&P
[facility]  INPATIENT H&P    REASON FOR CONSULT/ADMISSION:  Acute cholecystitis    HPI: Asher Brunner is a 23 y.o. female who presented to [facility] ER yesterday evening with acute onset of pelvic pain started the day prior.  She had decreased appetite and mild nausea.  She has had had some pain with urination but no burning in UA was negative.  She had recently been tested for STDs which was negative per the patient. Based off her symptoms an ultrasound was obtained which showed a thickened and dilated appendix.  She had a normal white count and was not septic appearing.  She was started on antibiotics and fluids and transfers arrange for surgical evaluation at [facility].    I have reviewed the patient's chart including prior progress notes, procedures and testing.  UA an UPT negative.    ROS:   Review of Systems   Constitutional: Negative for appetite change, chills and fever.   HENT: Negative for congestion, nosebleeds and trouble swallowing.    Eyes: Negative for photophobia, discharge and visual disturbance.   Respiratory: Negative for apnea, chest tightness and shortness of breath.    Cardiovascular: Negative for chest pain and leg swelling.   Gastrointestinal: Positive for abdominal pain (Located mostly in the right lower quadrant, positive Rovsing sign) and nausea. Negative for abdominal distention, diarrhea and vomiting.   Genitourinary: Positive for dysuria ( some pain but no burning) and pelvic pain. Negative for difficulty urinating and hematuria.   Musculoskeletal: Negative for arthralgias, neck pain and neck stiffness.   Skin: Negative for color change, pallor, rash and wound.   Neurological: Negative for seizures, syncope and headaches.   Psychiatric/Behavioral: Negative for agitation, behavioral problems and confusion.       PROBLEM LIST:  Patient Active Problem List   Diagnosis    Appendicitis         HISTORY  History reviewed. No pertinent past medical  history.    History reviewed. No pertinent surgical history.    Social History     Tobacco Use    Smoking status: Never Smoker    Smokeless tobacco: Never Used   Substance Use Topics    Alcohol use: No    Drug use: Yes     Types: Marijuana       Family History   Problem Relation Age of Onset    No Known Problems Mother     No Known Problems Father          MEDS:  No current facility-administered medications on file prior to encounter.      Current Outpatient Medications on File Prior to Encounter   Medication Sig Dispense Refill    alprazolam (XANAX) 0.25 MG tablet Take 1 tablet (0.25 mg total) by mouth 3 (three) times daily as needed for Anxiety. 15 tablet 0       ALLERGIES:  Review of patient's allergies indicates:   Allergen Reactions    Shellfish containing products Hives     crabmeat    Codeine Rash         VITALS:  Temp:  [97.4 °F (36.3 °C)-98.3 °F (36.8 °C)] 97.4 °F (36.3 °C)  Pulse:  [69-90] 72  Resp:  [16-20] 16  SpO2:  [99 %-100 %] 100 %  BP: ()/(50-74) 100/52    No intake/output data recorded.      PHYSICAL EXAM:  Physical Exam   Constitutional: She is oriented to person, place, and time. She appears well-developed and well-nourished. No distress.   HENT:   Head: Normocephalic and atraumatic.   Nose: Nose normal.   Eyes: EOM are normal. Pupils are equal, round, and reactive to light. No scleral icterus.   Neck: Normal range of motion. Neck supple. No tracheal deviation present.   Cardiovascular: Normal rate, regular rhythm and intact distal pulses.   Pulmonary/Chest: Effort normal and breath sounds normal. No respiratory distress.   Abdominal: Soft. She exhibits no distension and no mass. There is tenderness (Right lower quadrant and left lower quadrant rebound tenderness). There is guarding ( localized guarding the right lower quadrant).   Musculoskeletal: Normal range of motion. She exhibits no edema or deformity.   Neurological: She is alert and oriented to person, place, and time. She  exhibits normal muscle tone.   Skin: Skin is warm and dry. She is not diaphoretic. No erythema.   Psychiatric: She has a normal mood and affect. Her behavior is normal. Judgment and thought content normal.   Vitals reviewed.        LABS:  Lab Results   Component Value Date    WBC 8.10 12/29/2018    RBC 4.04 12/29/2018    HGB 12.4 12/29/2018    HCT 39.1 12/29/2018     12/29/2018     Lab Results   Component Value Date    GLU 86 12/29/2018     12/29/2018    K 3.8 12/29/2018     12/29/2018    CO2 26 12/29/2018    BUN 11 12/29/2018    CREATININE 0.57 12/29/2018    CALCIUM 9.5 12/29/2018     Lab Results   Component Value Date    ALT 18 04/26/2016    AST 22 04/26/2016    ALKPHOS 93 04/26/2016    BILITOT 0.8 04/26/2016     No results found for: MG, PHOS    STUDIES:  Ultrasound images and reports were personally reviewed.  There appears to be a tubular structure consistent with appendix is thickened with some    FINDINGS:  A prominent, tubular structure measures 0.9 cm in maximal dimension.  Internal debris or fluid noted within the tubular structure.  Finding is suspicious for an acute appendicitis.    ASSESSMENT & PLAN:  23 y.o. female with suspected acute appendicitis  - admit to surgery  - NPO with IV fluids  - p.r.n. antiemetics and pain medication  - Zosyn  - risks, benefits and alternatives for treatment of appendicitis discussed with the patient. She agrees to proceed with laparoscopic versus open appendectomy.  All questions and concerns addressed

## 2018-12-30 NOTE — TRANSFER OF CARE
"Anesthesia Transfer of Care Note    Patient: Asher Brunner    Procedure(s) Performed: Procedure(s) (LRB):  APPENDECTOMY, LAPAROSCOPIC (N/A)    Patient location: PACU    Anesthesia Type: general    Transport from OR: Transported from OR on room air with adequate spontaneous ventilation    Post pain: adequate analgesia    Post assessment: no apparent anesthetic complications and tolerated procedure well    Post vital signs: stable    Level of consciousness: awake, alert and oriented    Nausea/Vomiting: no nausea/vomiting    Complications: none    Transfer of care protocol was followed      Last vitals:   Visit Vitals  /71   Pulse 97   Temp 36.5 °C (97.7 °F) (Skin)   Resp 15   Ht 5' 8" (1.727 m)   Wt 52 kg (114 lb 10.2 oz)   LMP 12/25/2018 (Approximate)   SpO2 99%   Breastfeeding? No   BMI 17.43 kg/m²     "

## 2018-12-31 ENCOUNTER — TELEPHONE (OUTPATIENT)
Dept: SURGERY | Facility: CLINIC | Age: 23
End: 2018-12-31

## 2018-12-31 VITALS
TEMPERATURE: 98 F | RESPIRATION RATE: 14 BRPM | BODY MASS INDEX: 17.37 KG/M2 | HEART RATE: 75 BPM | DIASTOLIC BLOOD PRESSURE: 56 MMHG | HEIGHT: 68 IN | SYSTOLIC BLOOD PRESSURE: 100 MMHG | WEIGHT: 114.63 LBS | OXYGEN SATURATION: 99 %

## 2018-12-31 LAB
GRAM STN SPEC: NORMAL
GRAM STN SPEC: NORMAL

## 2018-12-31 PROCEDURE — G0378 HOSPITAL OBSERVATION PER HR: HCPCS

## 2018-12-31 PROCEDURE — 25000003 PHARM REV CODE 250: Performed by: PHYSICIAN ASSISTANT

## 2018-12-31 RX ORDER — ASPIRIN 81 MG
100 TABLET, DELAYED RELEASE (ENTERIC COATED) ORAL 2 TIMES DAILY
Qty: 20 TABLET | Refills: 0 | Status: SHIPPED | OUTPATIENT
Start: 2018-12-31 | End: 2019-11-27

## 2018-12-31 RX ORDER — METRONIDAZOLE 500 MG/1
500 TABLET ORAL EVERY 12 HOURS
Qty: 10 TABLET | Refills: 0 | Status: SHIPPED | OUTPATIENT
Start: 2018-12-31 | End: 2019-11-27

## 2018-12-31 RX ORDER — CIPROFLOXACIN 500 MG/1
500 TABLET ORAL EVERY 12 HOURS
Qty: 10 TABLET | Refills: 0 | Status: SHIPPED | OUTPATIENT
Start: 2018-12-31 | End: 2019-11-27

## 2018-12-31 RX ORDER — HYDROCODONE BITARTRATE AND ACETAMINOPHEN 5; 325 MG/1; MG/1
1 TABLET ORAL EVERY 6 HOURS PRN
Qty: 28 TABLET | Refills: 0 | Status: SHIPPED | OUTPATIENT
Start: 2018-12-31 | End: 2019-11-27

## 2018-12-31 RX ADMIN — HYDROCODONE BITARTRATE AND ACETAMINOPHEN 1 TABLET: 5; 325 TABLET ORAL at 12:12

## 2018-12-31 RX ADMIN — HYDROCODONE BITARTRATE AND ACETAMINOPHEN 1 TABLET: 5; 325 TABLET ORAL at 09:12

## 2018-12-31 NOTE — TELEPHONE ENCOUNTER
----- Message from Cristóbal Currie sent at 12/31/2018 12:21 PM CST -----  Contact: self/[phone]  Patient would like to be seen for a hospital follow up appointment per her surgery discharge instructions.      Please call and advise.      13/31/2018        1320  Attempted to contact patient to inform her of her follow up appointment with Dr. Navarro. No answer. Could not leave a voicemail because it was not set up.

## 2018-12-31 NOTE — PLAN OF CARE
Pt had appendectomy and will dc today.  No needs identified and f/u appt scheduled.     12/31/18 1212   Discharge Assessment   Assessment Type Discharge Planning Assessment   Confirmed/corrected address and phone number on facesheet? Yes   Assessment information obtained from? Patient   Communicated expected length of stay with patient/caregiver yes   Prior to hospitilization cognitive status: Alert/Oriented;No Deficits   Prior to hospitalization functional status: Independent   Current cognitive status: Alert/Oriented;No Deficits   Current Functional Status: Independent   Lives With significant other   Able to Return to Prior Arrangements yes   Is patient able to care for self after discharge? Yes   Patient's perception of discharge disposition home or selfcare   Readmission Within the Last 30 Days no previous admission in last 30 days   Patient currently being followed by outpatient case management? No   Patient currently receives any other outside agency services? No   Equipment Currently Used at Home none   Do you have any problems affording any of your prescribed medications? No   Is the patient taking medications as prescribed? yes   Does the patient have transportation home? Yes   Transportation Anticipated family or friend will provide   Does the patient receive services at the [facility]? No   Discharge Plan A Home;Home with family   Discharge Plan B Home;Home with family   Patient/Family in Agreement with Plan yes

## 2018-12-31 NOTE — PLAN OF CARE
Problem: Adult Inpatient Plan of Care  Goal: Plan of Care Review  Outcome: Ongoing (interventions implemented as appropriate)  Patient POD # 1  LAP SITES X3 DERMABOND noted to each site . Left AC with dsg CDI IVFS dcd mid shift as per md order . Advanced to regular diet . Domingo well . Complained of gas cramps MD notified simethicone ordered . Ambulated independently in [facility] . Call light in reach . Will continue to monitor .

## 2018-12-31 NOTE — PROGRESS NOTES
22-year-old female status post laparoscopic appendectomy possible negative appendicitis    Pre seen examined this morning.  No acute events overnight.  Pain is improved since yesterday.  Tolerating regular diet without issue.  No nausea vomiting.  He ambulated with these    Temp:  [97 °F (36.1 °C)-98.2 °F (36.8 °C)] 98.2 °F (36.8 °C)  Pulse:  [66-97] 75  Resp:  [13-18] 14  SpO2:  [95 %-100 %] 99 %  BP: ()/(50-71) 100/56    Abdomen soft with appropriate tenderness at laparoscopic port site incisions.  All incisions intact without any signs of breakdown or bleeding.    AP  Discharge home today  Prescribed 5 days of antibiotics given unknown source of pelvic fluid and pain  Follow-up cultures  Return to clinic in 2 weeks

## 2018-12-31 NOTE — NURSING
Patient discharged in wheelchair by escort services. IV removed, no bleeding noted. Discharge instructions and educational materials reviewed with patient and significant other. Patient and significant other verbalize clear understanding of all materials. No acute distress noted upon discharge.

## 2018-12-31 NOTE — PLAN OF CARE
Pt agrees to call and schedule appt 1-3-19 due to we are unable to schedule one because of holiday.  Message sent to the nurse as well.     12/31/18 1227   Final Note   Assessment Type Final Discharge Note   Anticipated Discharge Disposition Home   Hospital Follow Up  Appt(s) scheduled? No   Discharge plans and expectations educations in teach back method with documentation complete? Yes   Right Care Referral Info   Post Acute Recommendation No Care

## 2018-12-31 NOTE — DISCHARGE SUMMARY
[facility]  General Surgery  Discharge Summary      Patient Name: Asher Brunner  MRN: [id number]  Admission Date: 12/29/2018  Hospital Length of Stay: 2 days  Discharge Date and Time:  12/31/2018 12:07 PM  Attending Physician: Dmitry Navarro Jr.*   Discharging Provider: Dmitry Navarro Jr, MD  Primary Care Provider: Primary Doctor No     HPI:  23-year-old female presented to [facility] ER with acute onset of abdominal pain localized right lower quadrant. She had no significant leukocytosis however she had tenderness right lower quadrant ultrasound was obtained which showed a dilated debris-filled tubular structure suspicious for appendicitis.  Patient transferred to [facility] for surgical evaluation    Procedure(s) (LRB):  APPENDECTOMY, LAPAROSCOPIC (N/A)     Hospital Course:  Patient was laparoscopic appendectomy on 12/30/2018.  At that time her appendix was found to be grossly normal old if only mildly dilated.  There is some fluid in the pelvis which was cultured.  Postop bleed patient was returned to the floor.  Her pain was well controlled.  She was tolerating a diet without significant nausea or vomiting.  Her abdominal pain had improved but not completely resolved.  I discussed with the results of the appendectomy informed likely discharged home on 5 days of Cipro Flagyl due to uncertain etiology of pain and pelvic fluid.  Also told patient I would follow-up on the cultures of the fluid and let them know if anything can back concerning.  She is instructed to call if she has any increasing abdominal pain, nausea or any other concerns.    Consults:     Significant Diagnostic Studies: Radiology: Ultrasound:  Dilated breathing feel tubular structure consistent with possible appendicitis.    Pending Diagnostic Studies:     None        Final Active Diagnoses:    Diagnosis Date Noted POA    PRINCIPAL PROBLEM:  Appendicitis [K37] 12/29/2018 Yes      Problems Resolved During this  Admission:      Discharged Condition: good    Disposition: Home or Self Care    Follow Up:    Patient Instructions:      Diet Adult Regular     Lifting restrictions   Order Comments: No heavy lifting greater than 20 lb, no strenuous activity, no straining for 4 weeks     Notify your health care provider if you experience any of the following:  temperature >100.4     Notify your health care provider if you experience any of the following:  persistent nausea and vomiting or diarrhea     Notify your health care provider if you experience any of the following:  severe uncontrolled pain     Notify your health care provider if you experience any of the following:  redness, tenderness, or signs of infection (pain, swelling, redness, odor or green/yellow discharge around incision site)     Notify your health care provider if you experience any of the following:  difficulty breathing or increased cough     Notify your health care provider if you experience any of the following:  severe persistent headache     Notify your health care provider if you experience any of the following:  worsening rash     Notify your health care provider if you experience any of the following:  persistent dizziness, light-headedness, or visual disturbances     Notify your health care provider if you experience any of the following:  increased confusion or weakness     No dressing needed   Order Comments: - A surgical glue has been placed over your incisions. Please leave the glue in place and do not attempt to remove it.   - It is ok to shower using mild soap and water over the incisions the day after your procedure. Pat dry your incisions. Do not soak in a bath tub or other body of water for 2 weeks or until cleared by your surgeon.   - If you noticed redness, swelling, fever, increasing pain or significant drainage from your wound please call the office or the hospital  after hours.     Shower on day dressing removed (No bath)      Medications:  Reconciled Home Medications:      Medication List      START taking these medications    ciprofloxacin HCl 500 MG tablet  Commonly known as:  CIPRO  Take 1 tablet (500 mg total) by mouth every 12 (twelve) hours.     docusate sodium 100 mg capsule  Take 100 mg by mouth 2 (two) times daily.     HYDROcodone-acetaminophen 5-325 mg per tablet  Commonly known as:  NORCO  Take 1 tablet by mouth every 6 (six) hours as needed.     metroNIDAZOLE 500 MG tablet  Commonly known as:  FLAGYL  Take 1 tablet (500 mg total) by mouth every 12 (twelve) hours.        CONTINUE taking these medications    ALPRAZolam 0.25 MG tablet  Commonly known as:  XANAX  Take 1 tablet (0.25 mg total) by mouth 3 (three) times daily as needed for Anxiety.            Dmitry Navarro Jr, MD  General Surgery  [facility]

## 2018-12-31 NOTE — DISCHARGE INSTRUCTIONS
Appendicitis, What Is (English) View Edit Remove  Appendix Removal, Open Appendectomy, Discharge Instructions for (English) View Edit Remove  Ciprofloxacin tablets (English) View Edit Remove  Docusate capsules (English) View Edit Remove  Acetaminophen; Hydrocodone tablets or capsules (English) View Edit Remove  Metronidazole tablets or capsules (English) View Edit Remove

## 2019-01-03 LAB — BACTERIA SPEC AEROBE CULT: NO GROWTH

## 2019-01-07 LAB — BACTERIA SPEC ANAEROBE CULT: NORMAL

## 2019-01-15 ENCOUNTER — TELEPHONE (OUTPATIENT)
Dept: SURGERY | Facility: CLINIC | Age: 24
End: 2019-01-15

## 2019-01-15 NOTE — TELEPHONE ENCOUNTER
----- Message from Sona Cooper sent at 1/15/2019  9:53 AM CST -----  Contact: self / [phone]  Patient is requesting a call back regarding, she came to [facility] by mistake. She will be on her way to your office now. Please advise         01/15/2019        1053  Attempted to contact patient regarding the above message. Could not leave a voicemail because it has not been set up yet.

## 2019-11-26 ENCOUNTER — HOSPITAL ENCOUNTER (EMERGENCY)
Facility: HOSPITAL | Age: 24
Discharge: HOME | End: 2019-11-27
Attending: EMERGENCY MEDICINE
Payer: MEDICAID

## 2019-11-26 DIAGNOSIS — O26.891 ABDOMINAL PAIN DURING PREGNANCY IN FIRST TRIMESTER: ICD-10-CM

## 2019-11-26 DIAGNOSIS — O00.80 OTHER ECTOPIC PREGNANCY WITHOUT INTRAUTERINE PREGNANCY: Primary | ICD-10-CM

## 2019-11-26 DIAGNOSIS — R10.9 ABDOMINAL PAIN DURING PREGNANCY IN FIRST TRIMESTER: ICD-10-CM

## 2019-11-26 LAB
B-HCG UR QL: POSITIVE
BILIRUB UR QL STRIP: NEGATIVE
CLARITY UR: CLEAR
COLOR UR: YELLOW
CTP QC/QA: YES
GLUCOSE UR QL STRIP: NEGATIVE
HGB UR QL STRIP: NEGATIVE
KETONES UR QL STRIP: NEGATIVE
LEUKOCYTE ESTERASE UR QL STRIP: ABNORMAL
MICROSCOPIC COMMENT: NORMAL
NITRITE UR QL STRIP: NEGATIVE
PH UR STRIP: 6 [PH] (ref 5–8)
PROT UR QL STRIP: NEGATIVE
SP GR UR STRIP: >=1.03 (ref 1–1.03)
URN SPEC COLLECT METH UR: ABNORMAL
UROBILINOGEN UR STRIP-ACNC: NEGATIVE EU/DL
WBC #/AREA URNS HPF: 5 /HPF (ref 0–5)

## 2019-11-26 PROCEDURE — 81025 URINE PREGNANCY TEST: CPT | Performed by: EMERGENCY MEDICINE

## 2019-11-26 PROCEDURE — 99284 EMERGENCY DEPT VISIT MOD MDM: CPT | Mod: 25

## 2019-11-26 PROCEDURE — 96372 THER/PROPH/DIAG INJ SC/IM: CPT

## 2019-11-26 PROCEDURE — 80053 COMPREHEN METABOLIC PANEL: CPT

## 2019-11-26 PROCEDURE — 85027 COMPLETE CBC AUTOMATED: CPT

## 2019-11-26 PROCEDURE — 81000 URINALYSIS NONAUTO W/SCOPE: CPT

## 2019-11-26 PROCEDURE — 84702 CHORIONIC GONADOTROPIN TEST: CPT

## 2019-11-27 VITALS
WEIGHT: 115 LBS | TEMPERATURE: 98 F | HEART RATE: 88 BPM | HEIGHT: 68 IN | SYSTOLIC BLOOD PRESSURE: 110 MMHG | RESPIRATION RATE: 16 BRPM | DIASTOLIC BLOOD PRESSURE: 67 MMHG | OXYGEN SATURATION: 99 % | BODY MASS INDEX: 17.43 KG/M2

## 2019-11-27 LAB
ALBUMIN SERPL BCP-MCNC: 4.5 G/DL (ref 3.5–5.2)
ALP SERPL-CCNC: 86 U/L (ref 55–135)
ALT SERPL W/O P-5'-P-CCNC: 20 U/L (ref 10–44)
ANION GAP SERPL CALC-SCNC: 9 MMOL/L (ref 8–16)
AST SERPL-CCNC: 20 U/L (ref 10–40)
BILIRUB SERPL-MCNC: 0.3 MG/DL (ref 0.1–1)
BUN SERPL-MCNC: 12 MG/DL (ref 6–20)
CALCIUM SERPL-MCNC: 9.9 MG/DL (ref 8.7–10.5)
CHLORIDE SERPL-SCNC: 104 MMOL/L (ref 95–110)
CO2 SERPL-SCNC: 25 MMOL/L (ref 23–29)
CREAT SERPL-MCNC: 0.7 MG/DL (ref 0.5–1.4)
ERYTHROCYTE [DISTWIDTH] IN BLOOD BY AUTOMATED COUNT: 12.5 % (ref 11.5–14.5)
EST. GFR  (AFRICAN AMERICAN): >60 ML/MIN/1.73 M^2
EST. GFR  (NON AFRICAN AMERICAN): >60 ML/MIN/1.73 M^2
GLUCOSE SERPL-MCNC: 120 MG/DL (ref 70–110)
HCG INTACT+B SERPL-ACNC: 5392 MIU/ML
HCT VFR BLD AUTO: 39.4 % (ref 37–48.5)
HGB BLD-MCNC: 12.6 G/DL (ref 12–16)
MCH RBC QN AUTO: 30.7 PG (ref 27–31)
MCHC RBC AUTO-ENTMCNC: 32 G/DL (ref 32–36)
MCV RBC AUTO: 96 FL (ref 82–98)
PLATELET # BLD AUTO: 334 K/UL (ref 150–350)
PMV BLD AUTO: 10.8 FL (ref 9.2–12.9)
POTASSIUM SERPL-SCNC: 4.1 MMOL/L (ref 3.5–5.1)
PROT SERPL-MCNC: 8 G/DL (ref 6–8.4)
RBC # BLD AUTO: 4.11 M/UL (ref 4–5.4)
SODIUM SERPL-SCNC: 138 MMOL/L (ref 136–145)
WBC # BLD AUTO: 7.77 K/UL (ref 3.9–12.7)

## 2019-11-27 PROCEDURE — 99283 PR EMERGENCY DEPT VISIT,LEVEL III: ICD-10-PCS | Mod: ,,, | Performed by: OBSTETRICS & GYNECOLOGY

## 2019-11-27 PROCEDURE — 63600175 PHARM REV CODE 636 W HCPCS: Performed by: EMERGENCY MEDICINE

## 2019-11-27 PROCEDURE — 99283 EMERGENCY DEPT VISIT LOW MDM: CPT | Mod: ,,, | Performed by: OBSTETRICS & GYNECOLOGY

## 2019-11-27 RX ORDER — METHOTREXATE 25 MG/ML
50 INJECTION, SOLUTION INTRA-ARTERIAL; INTRAMUSCULAR; INTRAVENOUS ONCE
Status: COMPLETED | OUTPATIENT
Start: 2019-11-27 | End: 2019-11-27

## 2019-11-27 RX ADMIN — METHOTREXATE 80 MG: 25 SOLUTION INTRA-ARTERIAL; INTRAMUSCULAR; INTRATHECAL; INTRAVENOUS at 04:11

## 2019-11-27 NOTE — ED NOTES
Spoke to [facility] Pharmacist to request delivery of Methotrexate. Pharmacist advised that it would be 30-45 minutes to prepare and deliver med.

## 2019-11-27 NOTE — ED NOTES
Call received from JUDITH Bullock with instructions not to give Methotrexate until he is able to visit with patient.

## 2019-11-27 NOTE — ED NOTES
Per Dr. Ksaper request, Copies of patient's notes and labs sent with patient for f/u with her personal OB Gyn at [facility]

## 2019-11-27 NOTE — ED PROVIDER NOTES
Encounter Date: 2019       History     Chief Complaint   Patient presents with    Abdominal Pain     reports intermittent generalized abd pain X weeks. reports is 5 weeks pregnant, G1. OB is at [place]. Pt reports was seen at a clinic in [place] and was told is having ectopic pregnancy, was then seen in ED and told not ectopic. then was called by clinic and told to go to ED for further evaluation because it is ectopic. LMP 10/8/19     Asher Brunner is a 24 y.o. female who  has no past medical history on file.    The patient presents to the ED due to abdominal pain in early pregnancy.  She is . She reports history of irregular menstrual cycles, and states her last cycle was about 4-5 weeks ago.  A few weeks ago, she began having intermittent lower abdominal pain. She was recently seen in an outpatient clinic in [place] while she was visiting, and was told that she may have an ectopic pregnancy.  She was told to go to the ER for further evaluation, where she was evaluated with blood work and an ultrasound.  She was told they could not see a baby, and to follow up with her OB.  She presents to the ER today, after the clinic called her and told her to be evaluated due to high likelihood of ectopic pregnancy.  She denies any vaginal bleeding/discharge, severe abdominal pain, nausea/vomiting, fever, recent illness, or any other concerns or complaints at this time.          Review of patient's allergies indicates:   Allergen Reactions    Shellfish containing products Hives     crabmeat    Codeine Rash     History reviewed. No pertinent past medical history.  Past Surgical History:   Procedure Laterality Date    LAPAROSCOPIC APPENDECTOMY N/A 2018    Procedure: APPENDECTOMY, LAPAROSCOPIC;  Surgeon: Dmitry Navarro Jr., MD;  Location: [facility] OR;  Service: General;  Laterality: N/A;     Family History   Problem Relation Age of Onset    No Known Problems Mother     No Known Problems Father      Social  History     Tobacco Use    Smoking status: Current Some Day Smoker    Smokeless tobacco: Never Used    Tobacco comment: Patient reports 1-2 cigarettes/day   Substance Use Topics    Alcohol use: No    Drug use: Yes     Types: Marijuana     Comment: Denies current marijuana use     Review of Systems   Constitutional: Negative for chills and fever.   HENT: Negative for sore throat.    Respiratory: Negative for cough and shortness of breath.    Cardiovascular: Negative for chest pain.   Gastrointestinal: Positive for abdominal pain. Negative for constipation, diarrhea, nausea and vomiting.   Genitourinary: Negative for dysuria, frequency, urgency, vaginal bleeding and vaginal discharge.   Musculoskeletal: Negative for back pain.   Skin: Negative for rash and wound.   Neurological: Negative for syncope and weakness.   Hematological: Does not bruise/bleed easily.   Psychiatric/Behavioral: Negative for agitation, behavioral problems and confusion.       Physical Exam     Initial Vitals [11/26/19 2317]   BP Pulse Resp Temp SpO2   (!) 108/59 95 18 98.9 °F (37.2 °C) 100 %      MAP       --         Physical Exam    Nursing note and vitals reviewed.  Constitutional: She appears well-developed and well-nourished. She is not diaphoretic. No distress.   Well-appearing, no acute distress.   HENT:   Head: Normocephalic and atraumatic.   Mouth/Throat: Oropharynx is clear and moist.   Eyes: EOM are normal. Pupils are equal, round, and reactive to light.   Neck: No tracheal deviation present.   Cardiovascular: Normal rate, regular rhythm, normal heart sounds and intact distal pulses.   Pulmonary/Chest: Breath sounds normal. No stridor. No respiratory distress. She has no wheezes.   Abdominal: Soft. Bowel sounds are normal. She exhibits no distension and no mass. There is no tenderness. There is no rigidity, no rebound, no guarding, no CVA tenderness, no tenderness at McBurney's point and negative Bailon's sign.   No tenderness.    Musculoskeletal: Normal range of motion. She exhibits no edema.   Neurological: She is alert and oriented to person, place, and time. She has normal strength. No cranial nerve deficit or sensory deficit.   Skin: Skin is warm and dry. Capillary refill takes less than 2 seconds. No pallor.   Psychiatric: She has a normal mood and affect. Her behavior is normal. Thought content normal.         ED Course   Procedures  Labs Reviewed   URINALYSIS, REFLEX TO URINE CULTURE - Abnormal; Notable for the following components:       Result Value    Specific Gravity, UA >=1.030 (*)     Leukocytes, UA Trace (*)     All other components within normal limits    Narrative:     Preferred Collection Type->Urine, Clean Catch   COMPREHENSIVE METABOLIC PANEL - Abnormal; Notable for the following components:    Glucose 120 (*)     All other components within normal limits   POCT URINE PREGNANCY - Abnormal; Notable for the following components:    POC Preg Test, Ur Positive (*)     All other components within normal limits   HCG, QUANTITATIVE, PREGNANCY   URINALYSIS MICROSCOPIC    Narrative:     Preferred Collection Type->Urine, Clean Catch   CBC WITHOUT DIFFERENTIAL          Imaging Results           US OB Less Than 14 Wks with Transvag(xpd (Final result)  Result time 11/27/19 00:58:48    Final result by Carly Gaxiola MD (11/27/19 00:58:48)                 Impression:      Live ectopic pregnancy in the left adnexa.    Complex collection within the endometrium.    Findings called to Dr. Nava at 00:54 on 11/27/2019.    This report was flagged in Epic as abnormal.      Electronically signed by: Carly Gaxiola  Date:    11/27/2019  Time:    00:58             Narrative:    EXAMINATION:  ULTRASOUND OBSTETRICAL ULTRASOUND LESS THAN 14 WEEKS WITH TRANSVAGINAL    CLINICAL HISTORY:  abd pain, early pregnancy, told by OB current ectopic pregnancy;    TECHNIQUE:  Real-time ultrasound obstetrical ultrasound less than 14 weeks was performed  transabdominally and  transvaginally.    COMPARISON:  None.    FINDINGS:  The uterus measures 8 x 3.4 x 5 cm. There are no uterine masses. A fetal pole, yolk sac, and gestational sac are all visualized in the left adnexa.  The fetal pole has a crown-rump length of 3.2 mm, which corresponds to a gestational age of 6 weeks.  The fetal heart rate is 113 beats per minute.    There is a complex collection seen within the endometrium measuring 1 x 1.2 x 1 0 cm.  The possibility of this being hemorrhagic product cannot be entirely excluded.    The right ovary is not visualized.  The left ovary measures 4.1 x 2.9 x 2.6 cm.  There is a left corpus luteum measuring 2.6 x 2.2 x 2.0 cm.    No free fluid is seen in the cul-de-sac.                                 Medical Decision Making:   History:   Old Medical Records: I decided to obtain old medical records.  Old Records Summarized: other records.  Initial Assessment:   24-year-old female, , currently about 5 weeks pregnant by last menstrual cycle, presents to ED due to concern for ectopic pregnancy.  She was seen in outpatient clinic recently and was told she has an ectopic pregnancy.  Denies any pain, vaginal bleeding, or any other complaints at this time.  Will obtain labs, beta HCG, transvaginal ultrasound, and reassess.  Differential Diagnosis:   Differential Diagnosis includes, but is not limited to:  Pregnancy complication (threatened AB, inevitable AB, incomplete Ab, missed AB, uterine rupture, ectopic pregnancy, placental abruption, placenta previa), ovarian cyst/torsion, STD, foreign body, trauma, normal menstruation.    Clinical Tests:   Lab Tests: Reviewed and Ordered  Radiological Study: Ordered and Reviewed  ED Management:  US revealed left adnexal ectopic pregnancy.  Discussed with OB, Dr. Kasper, who recommends IM methotrexate and f/u appointment in OB clinic on Friday.   Discussed results with patient, and offered options of methotrexate vs surgical  intervention; patient prefers methotrexate and will be able to f/u in clinic on Friday.  IM methotrexate ordered and given in ED.  Patient given strict return precautions including worsening symptoms, severe pain, vaginal bleeding, or any other concerns.  Patient stable for D/C.    On re-evaluation, the patient's status has improved.  After complete ED evaluation, clinical impression is most consistent with ectopic pregnancy.  OB-GYN follow-up within 2-3 days was recommended.    After taking into careful account the patient's history, physical exam findings, as well as empirical and objective data obtained throughout ED workup, I feel no emergent medical condition has been identified. No further evaluation or admission was felt to be required, and the patient is stable for discharge from the ED. The patient and any additional family present were updated with test results, overall clinical impression, and recommended further plan of care, including discharge instructions as provided and outpatient follow-up for continued evaluation and management as needed. All questions were answered. The patient expressed understanding and agreed with current plan for discharge and follow-up plan of care. Strict ED return precautions were provided, including return/worsening of current symptoms, new symptoms, or any other concerns.                                   Clinical Impression:       ICD-10-CM ICD-9-CM   1. Other ectopic pregnancy without intrauterine pregnancy O00.80 633.80   2. Abdominal pain during pregnancy in first trimester O26.891 646.83    R10.9 789.00         Disposition:   Disposition: Discharged  Condition: Stable                     Albert Nava MD  11/28/19 [id number]

## 2019-11-27 NOTE — PROGRESS NOTES
OB/Gyn consultation  25y/o with Left Ectopic pregnancy and refuses Laparoscopy for treatment and prefers MTX. Pt does have some relative but no absolute contraindications  to medical therapy but has characteristics which suggest higher failure rate of Tx with MTX. Discussed in detail and pt prefers to f/u with Her Gynecologist Dr. Malcolm Starr. She already has a scheduled appt with him At 3pm today. Discussed risks and benefits of medical and surgical TX and reviewed signs and sx's to return to ER for Pt is hemodynamically stable and abdomen is soft with minimal LLQ tenderness.    Lab Results   Component Value Date    WBC 7.77 11/26/2019    HGB 12.6 11/26/2019    HCT 39.4 11/26/2019    MCV 96 11/26/2019     11/26/2019     hCG Quant See Text mIU/mL 5392        CMP  Sodium   Date Value Ref Range Status   11/26/2019 138 136 - 145 mmol/L Final     Potassium   Date Value Ref Range Status   11/26/2019 4.1 3.5 - 5.1 mmol/L Final     Chloride   Date Value Ref Range Status   11/26/2019 104 95 - 110 mmol/L Final     CO2   Date Value Ref Range Status   11/26/2019 25 23 - 29 mmol/L Final     Glucose   Date Value Ref Range Status   11/26/2019 120 (H) 70 - 110 mg/dL Final     BUN, Bld   Date Value Ref Range Status   11/26/2019 12 6 - 20 mg/dL Final     Creatinine   Date Value Ref Range Status   11/26/2019 0.7 0.5 - 1.4 mg/dL Final     Calcium   Date Value Ref Range Status   11/26/2019 9.9 8.7 - 10.5 mg/dL Final     Total Protein   Date Value Ref Range Status   11/26/2019 8.0 6.0 - 8.4 g/dL Final     Albumin   Date Value Ref Range Status   11/26/2019 4.5 3.5 - 5.2 g/dL Final     Total Bilirubin   Date Value Ref Range Status   11/26/2019 0.3 0.1 - 1.0 mg/dL Final     Comment:     For infants and newborns, interpretation of results should be based  on gestational age, weight and in agreement with clinical  observations.  Premature Infant recommended reference ranges:  Up to 24 hours.............<8.0 mg/dL  Up to 48  hours............<12.0 mg/dL  3-5 days..................<15.0 mg/dL  6-29 days.................<15.0 mg/dL       Alkaline Phosphatase   Date Value Ref Range Status   11/26/2019 86 55 - 135 U/L Final     AST   Date Value Ref Range Status   11/26/2019 20 10 - 40 U/L Final     ALT   Date Value Ref Range Status   11/26/2019 20 10 - 44 U/L Final     Anion Gap   Date Value Ref Range Status   11/26/2019 9 8 - 16 mmol/L Final     eGFR if    Date Value Ref Range Status   11/26/2019 >60 >60 mL/min/1.73 m^2 Final     eGFR if non    Date Value Ref Range Status   11/26/2019 >60 >60 mL/min/1.73 m^2 Final     Comment:     Calculation used to obtain the estimated glomerular filtration  rate (eGFR) is the CKD-EPI equation.                EXAMINATION:  ULTRASOUND OBSTETRICAL ULTRASOUND LESS THAN 14 WEEKS WITH TRANSVAGINAL    CLINICAL HISTORY:  abd pain, early pregnancy, told by OB current ectopic pregnancy;    TECHNIQUE:  Real-time ultrasound obstetrical ultrasound less than 14 weeks was performed transabdominally and  transvaginally.    COMPARISON:  None.    FINDINGS:  The uterus measures 8 x 3.4 x 5 cm. There are no uterine masses. A fetal pole, yolk sac, and gestational sac are all visualized in the left adnexa.  The fetal pole has a crown-rump length of 3.2 mm, which corresponds to a gestational age of 6 weeks.  The fetal heart rate is 113 beats per minute.    There is a complex collection seen within the endometrium measuring 1 x 1.2 x 1 0 cm.  The possibility of this being hemorrhagic product cannot be entirely excluded.    The right ovary is not visualized.  The left ovary measures 4.1 x 2.9 x 2.6 cm.  There is a left corpus luteum measuring 2.6 x 2.2 x 2.0 cm.    No free fluid is seen in the cul-de-sac.      Impression       Live ectopic pregnancy in the left adnexa.    Complex collection within the endometrium.    Findings called to Dr. Nava at 00:54 on 11/27/2019.    This report was flagged  in Epic as abnormal.      Electronically signed by: Carly Gaxiola  Date: 11/27/2019  Time: 00:58     Mtx 50mg/metersquared ordered by ER Dr. MURILLO precautions  F/u with Dr. Starr today to discuss necessary f/u for MTX vs Surgery  Return to ER sooner for worsening pain, hypovolemia sx's  Will need a Type & screen but can be ordered by her Gyn    Geraldo Kasper MD

## 2019-11-27 NOTE — DISCHARGE INSTRUCTIONS
Call OB-GYN clinic tomorrow to obtain appointment, clinic, and location details.  Follow-up in OB-GYN clinic on Friday for repeat labs and next steps.  Return to the ED for any severe abdominal pain, severe/heavy vaginal bleeding that does not stop, or for any other concerning symptoms.

## 2019-12-01 ENCOUNTER — ANESTHESIA EVENT (OUTPATIENT)
Dept: SURGERY | Facility: HOSPITAL | Age: 24
End: 2019-12-01
Payer: MEDICAID

## 2019-12-01 ENCOUNTER — HOSPITAL ENCOUNTER (OUTPATIENT)
Facility: HOSPITAL | Age: 24
Discharge: HOME | End: 2019-12-02
Attending: EMERGENCY MEDICINE | Admitting: OBSTETRICS & GYNECOLOGY
Payer: MEDICAID

## 2019-12-01 ENCOUNTER — ANESTHESIA (OUTPATIENT)
Dept: SURGERY | Facility: HOSPITAL | Age: 24
End: 2019-12-01
Payer: MEDICAID

## 2019-12-01 DIAGNOSIS — D62 ANEMIA ASSOCIATED WITH ACUTE BLOOD LOSS: ICD-10-CM

## 2019-12-01 DIAGNOSIS — R57.8 HEMORRHAGIC SHOCK: ICD-10-CM

## 2019-12-01 DIAGNOSIS — O00.102 HEMOPERITONEUM DUE TO RUPTURE OF LEFT TUBAL ECTOPIC PREGNANCY: ICD-10-CM

## 2019-12-01 DIAGNOSIS — Z98.890 STATUS POST LAPAROSCOPY: ICD-10-CM

## 2019-12-01 DIAGNOSIS — K66.1 HEMOPERITONEUM DUE TO RUPTURE OF LEFT TUBAL ECTOPIC PREGNANCY: ICD-10-CM

## 2019-12-01 DIAGNOSIS — K66.1 HEMOPERITONEUM: ICD-10-CM

## 2019-12-01 DIAGNOSIS — O00.102 RUPTURED LEFT TUBAL ECTOPIC PREGNANCY CAUSING HEMOPERITONEUM: ICD-10-CM

## 2019-12-01 DIAGNOSIS — K37 APPENDICITIS, UNSPECIFIED APPENDICITIS TYPE: ICD-10-CM

## 2019-12-01 DIAGNOSIS — K66.1 RUPTURED LEFT TUBAL ECTOPIC PREGNANCY CAUSING HEMOPERITONEUM: ICD-10-CM

## 2019-12-01 DIAGNOSIS — O00.90 RUPTURED ECTOPIC PREGNANCY: Primary | ICD-10-CM

## 2019-12-01 DIAGNOSIS — R10.9 ABDOMINAL PAIN, UNSPECIFIED ABDOMINAL LOCATION: ICD-10-CM

## 2019-12-01 LAB
ABO + RH BLD: NORMAL
ALBUMIN SERPL BCP-MCNC: 2.9 G/DL (ref 3.5–5.2)
ALP SERPL-CCNC: 38 U/L (ref 55–135)
ALT SERPL W/O P-5'-P-CCNC: 16 U/L (ref 10–44)
ANION GAP SERPL CALC-SCNC: 16 MMOL/L (ref 8–16)
ANION GAP SERPL CALC-SCNC: 8 MMOL/L (ref 8–16)
APTT BLDCRRT: 21.5 SEC (ref 21–32)
AST SERPL-CCNC: 11 U/L (ref 10–40)
BASOPHILS # BLD AUTO: 0.05 K/UL (ref 0–0.2)
BASOPHILS NFR BLD: 0.3 % (ref 0–1.9)
BILIRUB SERPL-MCNC: 0.3 MG/DL (ref 0.1–1)
BLD GP AB SCN CELLS X3 SERPL QL: NORMAL
BLD PROD TYP BPU: NORMAL
BLD PROD TYP BPU: NORMAL
BLOOD UNIT EXPIRATION DATE: NORMAL
BLOOD UNIT EXPIRATION DATE: NORMAL
BLOOD UNIT TYPE CODE: 9500
BLOOD UNIT TYPE CODE: 9500
BLOOD UNIT TYPE: NORMAL
BLOOD UNIT TYPE: NORMAL
BUN SERPL-MCNC: 10 MG/DL (ref 6–20)
BUN SERPL-MCNC: 9 MG/DL (ref 6–30)
CALCIUM SERPL-MCNC: 7.5 MG/DL (ref 8.7–10.5)
CHLORIDE SERPL-SCNC: 109 MMOL/L (ref 95–110)
CHLORIDE SERPL-SCNC: 113 MMOL/L (ref 95–110)
CO2 SERPL-SCNC: 18 MMOL/L (ref 23–29)
CODING SYSTEM: NORMAL
CODING SYSTEM: NORMAL
CREAT SERPL-MCNC: 0.5 MG/DL (ref 0.5–1.4)
CREAT SERPL-MCNC: 0.7 MG/DL (ref 0.5–1.4)
DIFFERENTIAL METHOD: ABNORMAL
DISPENSE STATUS: NORMAL
DISPENSE STATUS: NORMAL
EOSINOPHIL # BLD AUTO: 0 K/UL (ref 0–0.5)
EOSINOPHIL NFR BLD: 0 % (ref 0–8)
ERYTHROCYTE [DISTWIDTH] IN BLOOD BY AUTOMATED COUNT: 12.1 % (ref 11.5–14.5)
EST. GFR  (AFRICAN AMERICAN): >60 ML/MIN/1.73 M^2
EST. GFR  (NON AFRICAN AMERICAN): >60 ML/MIN/1.73 M^2
GLUCOSE SERPL-MCNC: 172 MG/DL (ref 70–110)
GLUCOSE SERPL-MCNC: 173 MG/DL (ref 70–110)
HCG INTACT+B SERPL-ACNC: 2572 MIU/ML
HCT VFR BLD AUTO: 24.5 % (ref 37–48.5)
HCT VFR BLD CALC: 16 %PCV (ref 36–54)
HGB BLD-MCNC: 7.7 G/DL (ref 12–16)
IMM GRANULOCYTES # BLD AUTO: 0.08 K/UL (ref 0–0.04)
IMM GRANULOCYTES NFR BLD AUTO: 0.5 % (ref 0–0.5)
INR PPP: 1.2 (ref 0.8–1.2)
LYMPHOCYTES # BLD AUTO: 1.6 K/UL (ref 1–4.8)
LYMPHOCYTES NFR BLD: 9.3 % (ref 18–48)
MCH RBC QN AUTO: 31.4 PG (ref 27–31)
MCHC RBC AUTO-ENTMCNC: 31.4 G/DL (ref 32–36)
MCV RBC AUTO: 100 FL (ref 82–98)
MONOCYTES # BLD AUTO: 0.4 K/UL (ref 0.3–1)
MONOCYTES NFR BLD: 2.4 % (ref 4–15)
NEUTROPHILS # BLD AUTO: 15 K/UL (ref 1.8–7.7)
NEUTROPHILS NFR BLD: 87.5 % (ref 38–73)
NRBC BLD-RTO: 0 /100 WBC
PLATELET # BLD AUTO: 268 K/UL (ref 150–350)
PMV BLD AUTO: 11.1 FL (ref 9.2–12.9)
POC IONIZED CALCIUM: 1.13 MMOL/L (ref 1.06–1.42)
POC TCO2 (MEASURED): 20 MMOL/L (ref 23–29)
POTASSIUM BLD-SCNC: 3.8 MMOL/L (ref 3.5–5.1)
POTASSIUM SERPL-SCNC: 3.8 MMOL/L (ref 3.5–5.1)
PROT SERPL-MCNC: 4.7 G/DL (ref 6–8.4)
PROTHROMBIN TIME: 12.4 SEC (ref 9–12.5)
RBC # BLD AUTO: 2.45 M/UL (ref 4–5.4)
SAMPLE: ABNORMAL
SODIUM BLD-SCNC: 141 MMOL/L (ref 136–145)
SODIUM SERPL-SCNC: 139 MMOL/L (ref 136–145)
TRANS ERYTHROCYTES VOL PATIENT: NORMAL ML
TRANS ERYTHROCYTES VOL PATIENT: NORMAL ML
WBC # BLD AUTO: 17.15 K/UL (ref 3.9–12.7)

## 2019-12-01 PROCEDURE — 84132 ASSAY OF SERUM POTASSIUM: CPT

## 2019-12-01 PROCEDURE — 63600175 PHARM REV CODE 636 W HCPCS: Performed by: ANESTHESIOLOGY

## 2019-12-01 PROCEDURE — 63600175 PHARM REV CODE 636 W HCPCS: Performed by: EMERGENCY MEDICINE

## 2019-12-01 PROCEDURE — 37000008 HC ANESTHESIA 1ST 15 MINUTES: Performed by: OBSTETRICS & GYNECOLOGY

## 2019-12-01 PROCEDURE — 27201423 OPTIME MED/SURG SUP & DEVICES STERILE SUPPLY: Performed by: OBSTETRICS & GYNECOLOGY

## 2019-12-01 PROCEDURE — 71000033 HC RECOVERY, INTIAL HOUR: Performed by: OBSTETRICS & GYNECOLOGY

## 2019-12-01 PROCEDURE — 99291 CRITICAL CARE FIRST HOUR: CPT | Mod: 25

## 2019-12-01 PROCEDURE — 99900035 HC TECH TIME PER 15 MIN (STAT)

## 2019-12-01 PROCEDURE — 96361 HYDRATE IV INFUSION ADD-ON: CPT

## 2019-12-01 PROCEDURE — 63600175 PHARM REV CODE 636 W HCPCS: Performed by: NURSE ANESTHETIST, CERTIFIED REGISTERED

## 2019-12-01 PROCEDURE — 63600175 PHARM REV CODE 636 W HCPCS: Performed by: OBSTETRICS & GYNECOLOGY

## 2019-12-01 PROCEDURE — 96374 THER/PROPH/DIAG INJ IV PUSH: CPT | Mod: 59

## 2019-12-01 PROCEDURE — 25000003 PHARM REV CODE 250: Performed by: OBSTETRICS & GYNECOLOGY

## 2019-12-01 PROCEDURE — 85610 PROTHROMBIN TIME: CPT

## 2019-12-01 PROCEDURE — 59151 TREAT ECTOPIC PREGNANCY: CPT | Mod: LT,,, | Performed by: OBSTETRICS & GYNECOLOGY

## 2019-12-01 PROCEDURE — 86920 COMPATIBILITY TEST SPIN: CPT

## 2019-12-01 PROCEDURE — 88305 TISSUE EXAM BY PATHOLOGIST: ICD-10-PCS | Mod: 26,,, | Performed by: PATHOLOGY

## 2019-12-01 PROCEDURE — 36000709 HC OR TIME LEV III EA ADD 15 MIN: Performed by: OBSTETRICS & GYNECOLOGY

## 2019-12-01 PROCEDURE — P9021 RED BLOOD CELLS UNIT: HCPCS

## 2019-12-01 PROCEDURE — 84702 CHORIONIC GONADOTROPIN TEST: CPT

## 2019-12-01 PROCEDURE — 36000708 HC OR TIME LEV III 1ST 15 MIN: Performed by: OBSTETRICS & GYNECOLOGY

## 2019-12-01 PROCEDURE — 85730 THROMBOPLASTIN TIME PARTIAL: CPT

## 2019-12-01 PROCEDURE — 25000003 PHARM REV CODE 250: Performed by: NURSE ANESTHETIST, CERTIFIED REGISTERED

## 2019-12-01 PROCEDURE — 84295 ASSAY OF SERUM SODIUM: CPT

## 2019-12-01 PROCEDURE — 82330 ASSAY OF CALCIUM: CPT

## 2019-12-01 PROCEDURE — D9220A PRA ANESTHESIA: Mod: CRNA,,, | Performed by: NURSE ANESTHETIST, CERTIFIED REGISTERED

## 2019-12-01 PROCEDURE — 85025 COMPLETE CBC W/AUTO DIFF WBC: CPT

## 2019-12-01 PROCEDURE — 00840 ANES IPER PX LOWER ABD NOS: CPT | Performed by: OBSTETRICS & GYNECOLOGY

## 2019-12-01 PROCEDURE — 86850 RBC ANTIBODY SCREEN: CPT

## 2019-12-01 PROCEDURE — 80053 COMPREHEN METABOLIC PANEL: CPT

## 2019-12-01 PROCEDURE — 88305 TISSUE EXAM BY PATHOLOGIST: CPT | Performed by: PATHOLOGY

## 2019-12-01 PROCEDURE — D9220A PRA ANESTHESIA: Mod: ANES,,, | Performed by: ANESTHESIOLOGY

## 2019-12-01 PROCEDURE — 59151 PR RX ECTOP PREG BY SCOPE,RMV TUBE/OVRY: ICD-10-PCS | Mod: LT,,, | Performed by: OBSTETRICS & GYNECOLOGY

## 2019-12-01 PROCEDURE — 37000009 HC ANESTHESIA EA ADD 15 MINS: Performed by: OBSTETRICS & GYNECOLOGY

## 2019-12-01 PROCEDURE — 88305 TISSUE EXAM BY PATHOLOGIST: CPT | Mod: 26,,, | Performed by: PATHOLOGY

## 2019-12-01 PROCEDURE — D9220A PRA ANESTHESIA: ICD-10-PCS | Mod: CRNA,,, | Performed by: NURSE ANESTHETIST, CERTIFIED REGISTERED

## 2019-12-01 PROCEDURE — 85014 HEMATOCRIT: CPT

## 2019-12-01 PROCEDURE — 82565 ASSAY OF CREATININE: CPT | Mod: 91

## 2019-12-01 PROCEDURE — D9220A PRA ANESTHESIA: ICD-10-PCS | Mod: ANES,,, | Performed by: ANESTHESIOLOGY

## 2019-12-01 PROCEDURE — 51702 INSERT TEMP BLADDER CATH: CPT

## 2019-12-01 RX ORDER — CEFAZOLIN SODIUM 1 G/3ML
INJECTION, POWDER, FOR SOLUTION INTRAMUSCULAR; INTRAVENOUS
Status: DISCONTINUED | OUTPATIENT
Start: 2019-12-01 | End: 2019-12-01

## 2019-12-01 RX ORDER — NEOSTIGMINE METHYLSULFATE 1 MG/ML
INJECTION, SOLUTION INTRAVENOUS
Status: DISCONTINUED | OUTPATIENT
Start: 2019-12-01 | End: 2019-12-01

## 2019-12-01 RX ORDER — OXYCODONE AND ACETAMINOPHEN 10; 325 MG/1; MG/1
1 TABLET ORAL EVERY 4 HOURS PRN
Status: DISCONTINUED | OUTPATIENT
Start: 2019-12-01 | End: 2019-12-02 | Stop reason: HOSPADM

## 2019-12-01 RX ORDER — SUCCINYLCHOLINE CHLORIDE 20 MG/ML
INJECTION INTRAMUSCULAR; INTRAVENOUS
Status: DISCONTINUED | OUTPATIENT
Start: 2019-12-01 | End: 2019-12-01

## 2019-12-01 RX ORDER — MUPIROCIN 20 MG/G
OINTMENT TOPICAL
Status: DISCONTINUED | OUTPATIENT
Start: 2019-12-01 | End: 2019-12-01 | Stop reason: HOSPADM

## 2019-12-01 RX ORDER — ACETAMINOPHEN 10 MG/ML
INJECTION, SOLUTION INTRAVENOUS
Status: DISPENSED
Start: 2019-12-01 | End: 2019-12-02

## 2019-12-01 RX ORDER — ROCURONIUM BROMIDE 10 MG/ML
INJECTION, SOLUTION INTRAVENOUS
Status: DISCONTINUED | OUTPATIENT
Start: 2019-12-01 | End: 2019-12-01

## 2019-12-01 RX ORDER — ACETAMINOPHEN 10 MG/ML
1000 INJECTION, SOLUTION INTRAVENOUS ONCE
Status: COMPLETED | OUTPATIENT
Start: 2019-12-01 | End: 2019-12-01

## 2019-12-01 RX ORDER — FENTANYL CITRATE 50 UG/ML
INJECTION, SOLUTION INTRAMUSCULAR; INTRAVENOUS
Status: DISCONTINUED | OUTPATIENT
Start: 2019-12-01 | End: 2019-12-01

## 2019-12-01 RX ORDER — HYDROMORPHONE HYDROCHLORIDE 2 MG/ML
0.2 INJECTION, SOLUTION INTRAMUSCULAR; INTRAVENOUS; SUBCUTANEOUS EVERY 5 MIN PRN
Status: DISCONTINUED | OUTPATIENT
Start: 2019-12-01 | End: 2019-12-01 | Stop reason: HOSPADM

## 2019-12-01 RX ORDER — MIDAZOLAM HYDROCHLORIDE 1 MG/ML
INJECTION, SOLUTION INTRAMUSCULAR; INTRAVENOUS
Status: DISCONTINUED | OUTPATIENT
Start: 2019-12-01 | End: 2019-12-01

## 2019-12-01 RX ORDER — FENTANYL CITRATE 50 UG/ML
25 INJECTION, SOLUTION INTRAMUSCULAR; INTRAVENOUS
Status: COMPLETED | OUTPATIENT
Start: 2019-12-01 | End: 2019-12-01

## 2019-12-01 RX ORDER — SODIUM CHLORIDE, SODIUM LACTATE, POTASSIUM CHLORIDE, CALCIUM CHLORIDE 600; 310; 30; 20 MG/100ML; MG/100ML; MG/100ML; MG/100ML
INJECTION, SOLUTION INTRAVENOUS CONTINUOUS PRN
Status: DISCONTINUED | OUTPATIENT
Start: 2019-12-01 | End: 2019-12-01

## 2019-12-01 RX ORDER — PHENYLEPHRINE HYDROCHLORIDE 10 MG/ML
INJECTION INTRAVENOUS
Status: DISCONTINUED | OUTPATIENT
Start: 2019-12-01 | End: 2019-12-01

## 2019-12-01 RX ORDER — AMOXICILLIN 250 MG
1 CAPSULE ORAL 2 TIMES DAILY
Status: DISCONTINUED | OUTPATIENT
Start: 2019-12-01 | End: 2019-12-02 | Stop reason: HOSPADM

## 2019-12-01 RX ORDER — HYDROCODONE BITARTRATE AND ACETAMINOPHEN 500; 5 MG/1; MG/1
TABLET ORAL
Status: DISCONTINUED | OUTPATIENT
Start: 2019-12-01 | End: 2019-12-02 | Stop reason: HOSPADM

## 2019-12-01 RX ORDER — IBUPROFEN 600 MG/1
600 TABLET ORAL EVERY 6 HOURS PRN
Status: DISCONTINUED | OUTPATIENT
Start: 2019-12-01 | End: 2019-12-02 | Stop reason: HOSPADM

## 2019-12-01 RX ORDER — CEFAZOLIN SODIUM 2 G/50ML
2 SOLUTION INTRAVENOUS
Status: DISCONTINUED | OUTPATIENT
Start: 2019-12-01 | End: 2019-12-01 | Stop reason: HOSPADM

## 2019-12-01 RX ORDER — OXYCODONE AND ACETAMINOPHEN 5; 325 MG/1; MG/1
1 TABLET ORAL EVERY 4 HOURS PRN
Status: DISCONTINUED | OUTPATIENT
Start: 2019-12-01 | End: 2019-12-02 | Stop reason: HOSPADM

## 2019-12-01 RX ORDER — SODIUM CHLORIDE 0.9 % (FLUSH) 0.9 %
10 SYRINGE (ML) INJECTION
Status: DISCONTINUED | OUTPATIENT
Start: 2019-12-01 | End: 2019-12-01 | Stop reason: HOSPADM

## 2019-12-01 RX ORDER — IBUPROFEN 800 MG/1
800 TABLET ORAL EVERY 8 HOURS PRN
Qty: 30 TABLET | Refills: 1 | Status: SHIPPED | OUTPATIENT
Start: 2019-12-01

## 2019-12-01 RX ORDER — ONDANSETRON 8 MG/1
8 TABLET, ORALLY DISINTEGRATING ORAL EVERY 8 HOURS PRN
Status: DISCONTINUED | OUTPATIENT
Start: 2019-12-01 | End: 2019-12-02 | Stop reason: HOSPADM

## 2019-12-01 RX ORDER — ONDANSETRON 2 MG/ML
INJECTION INTRAMUSCULAR; INTRAVENOUS
Status: DISCONTINUED | OUTPATIENT
Start: 2019-12-01 | End: 2019-12-01

## 2019-12-01 RX ORDER — DIPHENHYDRAMINE HCL 25 MG
25 CAPSULE ORAL EVERY 4 HOURS PRN
Status: DISCONTINUED | OUTPATIENT
Start: 2019-12-01 | End: 2019-12-02 | Stop reason: HOSPADM

## 2019-12-01 RX ORDER — SODIUM CHLORIDE 9 MG/ML
INJECTION, SOLUTION INTRAVENOUS CONTINUOUS
Status: DISCONTINUED | OUTPATIENT
Start: 2019-12-01 | End: 2019-12-02 | Stop reason: HOSPADM

## 2019-12-01 RX ORDER — GLYCOPYRROLATE 0.2 MG/ML
INJECTION INTRAMUSCULAR; INTRAVENOUS
Status: DISCONTINUED | OUTPATIENT
Start: 2019-12-01 | End: 2019-12-01

## 2019-12-01 RX ORDER — METOCLOPRAMIDE HYDROCHLORIDE 5 MG/ML
INJECTION INTRAMUSCULAR; INTRAVENOUS
Status: DISCONTINUED | OUTPATIENT
Start: 2019-12-01 | End: 2019-12-01

## 2019-12-01 RX ORDER — PROPOFOL 10 MG/ML
VIAL (ML) INTRAVENOUS
Status: DISCONTINUED | OUTPATIENT
Start: 2019-12-01 | End: 2019-12-01

## 2019-12-01 RX ORDER — LIDOCAINE HCL/PF 100 MG/5ML
SYRINGE (ML) INTRAVENOUS
Status: DISCONTINUED | OUTPATIENT
Start: 2019-12-01 | End: 2019-12-01

## 2019-12-01 RX ORDER — SODIUM CHLORIDE, SODIUM LACTATE, POTASSIUM CHLORIDE, CALCIUM CHLORIDE 600; 310; 30; 20 MG/100ML; MG/100ML; MG/100ML; MG/100ML
INJECTION, SOLUTION INTRAVENOUS CONTINUOUS
Status: DISCONTINUED | OUTPATIENT
Start: 2019-12-01 | End: 2019-12-02 | Stop reason: HOSPADM

## 2019-12-01 RX ADMIN — ROCURONIUM BROMIDE 5 MG: 10 INJECTION, SOLUTION INTRAVENOUS at 05:12

## 2019-12-01 RX ADMIN — ROCURONIUM BROMIDE 20 MG: 10 INJECTION, SOLUTION INTRAVENOUS at 05:12

## 2019-12-01 RX ADMIN — SODIUM CHLORIDE, SODIUM LACTATE, POTASSIUM CHLORIDE, AND CALCIUM CHLORIDE: .6; .31; .03; .02 INJECTION, SOLUTION INTRAVENOUS at 06:12

## 2019-12-01 RX ADMIN — FENTANYL CITRATE 50 MCG: 50 INJECTION INTRAMUSCULAR; INTRAVENOUS at 05:12

## 2019-12-01 RX ADMIN — HYDROMORPHONE HYDROCHLORIDE 0.2 MG: 2 INJECTION, SOLUTION INTRAMUSCULAR; INTRAVENOUS; SUBCUTANEOUS at 07:12

## 2019-12-01 RX ADMIN — CEFAZOLIN 1 G: 1 INJECTION, POWDER, FOR SOLUTION INTRAVENOUS at 05:12

## 2019-12-01 RX ADMIN — METOCLOPRAMIDE 10 MG: 5 INJECTION, SOLUTION INTRAMUSCULAR; INTRAVENOUS at 05:12

## 2019-12-01 RX ADMIN — FENTANYL CITRATE 25 MCG: 50 INJECTION, SOLUTION INTRAMUSCULAR; INTRAVENOUS at 04:12

## 2019-12-01 RX ADMIN — SODIUM CHLORIDE: 0.9 INJECTION, SOLUTION INTRAVENOUS at 06:12

## 2019-12-01 RX ADMIN — HYDROMORPHONE HYDROCHLORIDE 0.2 MG: 2 INJECTION, SOLUTION INTRAMUSCULAR; INTRAVENOUS; SUBCUTANEOUS at 06:12

## 2019-12-01 RX ADMIN — PHENYLEPHRINE HYDROCHLORIDE 200 MCG: 10 INJECTION INTRAVENOUS at 05:12

## 2019-12-01 RX ADMIN — SODIUM CHLORIDE 1000 ML: 0.9 INJECTION, SOLUTION INTRAVENOUS at 04:12

## 2019-12-01 RX ADMIN — OXYCODONE AND ACETAMINOPHEN 1 TABLET: 10; 325 TABLET ORAL at 07:12

## 2019-12-01 RX ADMIN — NEOSTIGMINE METHYLSULFATE 4 MG: 1 INJECTION INTRAVENOUS at 06:12

## 2019-12-01 RX ADMIN — SODIUM CHLORIDE 625 ML: 0.9 INJECTION, SOLUTION INTRAVENOUS at 06:12

## 2019-12-01 RX ADMIN — LIDOCAINE HYDROCHLORIDE 50 MG: 20 INJECTION, SOLUTION INTRAVENOUS at 05:12

## 2019-12-01 RX ADMIN — GLYCOPYRROLATE 0.2 MG: 0.2 INJECTION, SOLUTION INTRAMUSCULAR; INTRAVENOUS at 05:12

## 2019-12-01 RX ADMIN — PHENYLEPHRINE HYDROCHLORIDE 100 MCG: 10 INJECTION INTRAVENOUS at 05:12

## 2019-12-01 RX ADMIN — SENNOSIDES AND DOCUSATE SODIUM 1 TABLET: 8.6; 5 TABLET ORAL at 07:12

## 2019-12-01 RX ADMIN — GLYCOPYRROLATE 0.4 MG: 0.2 INJECTION, SOLUTION INTRAMUSCULAR; INTRAVENOUS at 06:12

## 2019-12-01 RX ADMIN — ONDANSETRON 4 MG: 2 INJECTION, SOLUTION INTRAMUSCULAR; INTRAVENOUS at 05:12

## 2019-12-01 RX ADMIN — ACETAMINOPHEN 1000 MG: 10 INJECTION, SOLUTION INTRAVENOUS at 06:12

## 2019-12-01 RX ADMIN — PROPOFOL 80 MG: 10 INJECTION, EMULSION INTRAVENOUS at 05:12

## 2019-12-01 RX ADMIN — MIDAZOLAM HYDROCHLORIDE 2 MG: 1 INJECTION, SOLUTION INTRAMUSCULAR; INTRAVENOUS at 05:12

## 2019-12-01 RX ADMIN — SODIUM CHLORIDE: 0.9 INJECTION, SOLUTION INTRAVENOUS at 05:12

## 2019-12-01 RX ADMIN — SODIUM CHLORIDE, SODIUM LACTATE, POTASSIUM CHLORIDE, AND CALCIUM CHLORIDE: .6; .31; .03; .02 INJECTION, SOLUTION INTRAVENOUS at 05:12

## 2019-12-01 RX ADMIN — SUCCINYLCHOLINE CHLORIDE 100 MG: 20 INJECTION, SOLUTION INTRAMUSCULAR; INTRAVENOUS at 05:12

## 2019-12-01 NOTE — ANESTHESIA PREPROCEDURE EVALUATION
12/01/2019    Pre-operative evaluation for Procedure(s) (LRB):  LAPAROTOMY, EXPLORATORY (N/A)    Asher Brunner is a 24 y.o. female     Patient Active Problem List   Diagnosis    Appendicitis       Review of patient's allergies indicates:   Allergen Reactions    Shellfish containing products Hives     crabmeat    Codeine Rash       No current facility-administered medications on file prior to encounter.      Current Outpatient Medications on File Prior to Encounter   Medication Sig Dispense Refill    alprazolam (XANAX) 0.25 MG tablet Take 1 tablet (0.25 mg total) by mouth 3 (three) times daily as needed for Anxiety. 15 tablet 0       Past Surgical History:   Procedure Laterality Date    APPENDECTOMY      LAPAROSCOPIC APPENDECTOMY N/A 12/30/2018    Procedure: APPENDECTOMY, LAPAROSCOPIC;  Surgeon: Dmitry Navarro Jr., MD;  Location: [facility];  Service: General;  Laterality: N/A;       VITALS  Vitals:    12/01/19 1526   BP:    Pulse:    Resp:    Temp: 36.4 °C (97.6 °F)       LABS  CBC  Lab Results   Component Value Date    WBC 17.15 (H) 12/01/2019    HGB 7.7 (L) 12/01/2019    HCT 16 (LL) 12/01/2019     (H) 12/01/2019     12/01/2019       BMP  Lab Results   Component Value Date     12/01/2019    K 3.8 12/01/2019     (H) 12/01/2019    CO2 18 (L) 12/01/2019    BUN 10 12/01/2019    CREATININE 0.7 12/01/2019    CALCIUM 7.5 (L) 12/01/2019    ANIONGAP 8 12/01/2019    ESTGFRAFRICA >60 12/01/2019    EGFRNONAA >60 12/01/2019         Anesthesia Evaluation    I have reviewed the Patient Summary Reports.    I have reviewed the Nursing Notes.   I have reviewed the Medications.     Review of Systems  Anesthesia Hx:  History of prior surgery of interest to airway management or planning: Denies Family Hx of Anesthesia complications.   Denies Personal Hx of Anesthesia complications.   Cardiovascular:  Cardiovascular Normal     Musculoskeletal:  Musculoskeletal Normal    OB/GYN/PEDS:  Ruptured  ectopic pregnancy   Neurological:  Neurology Normal    Dermatological:  Skin Normal    Psych:   anxiety          Physical Exam  General:  Well nourished    Airway/Jaw/Neck:  Airway Findings: Mouth Opening: Normal Tongue: Normal  General Airway Assessment: Adult  Mallampati: II  Improves to II with phonation.  TM Distance: Normal, at least 6 cm  Jaw/Neck Findings:  Neck ROM: Normal ROM      Dental:  Dental Findings: Lower braces, Upper braces   Chest/Lungs:  Chest/Lungs Findings: Tachypnea     Heart/Vascular:  Heart Findings: Rate: Normal  Rhythm: Regular Rhythm        Mental Status:  Mental Status Findings:  Cooperative, Alert and Oriented         Anesthesia Plan  Type of Anesthesia, risks & benefits discussed:  Anesthesia Type:  general  Patient's Preference:   Intra-op Monitoring Plan: standard ASA monitors  Intra-op Monitoring Plan Comments:   Post Op Pain Control Plan: per primary service following discharge from PACU  Post Op Pain Control Plan Comments:   Induction:   IV  Beta Blocker:  Patient is not currently on a Beta-Blocker (No further documentation required).       Informed Consent: Patient understands risks and agrees with Anesthesia plan.  Questions answered. Anesthesia consent signed with patient.  ASA Score: 1  emergent   Day of Surgery Review of History & Physical: I have interviewed and examined the patient. I have reviewed the patient's H&P dated:  There are no significant changes.          Ready For Surgery From Anesthesia Perspective.

## 2019-12-01 NOTE — HPI
25 yo G1 at ?6 weeks  History of ectopic pregnancy.  Status post methotrexate at [place] 11/26/2019  Came to our Emergency Department yesterday, 11/30/2019 with pain  However, she left because the wait was too long    Came back today with pain and lightheadedness

## 2019-12-01 NOTE — ASSESSMENT & PLAN NOTE
Will proceed with laparoscopy, evacuation of hemoperitoneum, removal of ruptured ectopic pregnancy now

## 2019-12-01 NOTE — ED NOTES
Blood bank called and said the blood was ready. Upon arrival blood bank personal advised me to bring the blood to the OR desk instead of the ER.  I walked the blood up to the surgery desk and gave the blood to the surgery personnel.

## 2019-12-01 NOTE — ED NOTES
Lab called to check and see if the type and match was completed.  Lab said not yet and I informed her that the pt needs blood stat.  Tech said to tell the MD to order the blood emergently.

## 2019-12-01 NOTE — SUBJECTIVE & OBJECTIVE
Obstetric HPI:  Known ectopic pregnancy, status post methotrexate on 2019  HCG was 5392  Pelvic ultrasound with left ectopic with fetal heart tones.      OB History    Para Term  AB Living   1 0 0 0 0 0   SAB TAB Ectopic Multiple Live Births   0 0 0 0 0      # Outcome Date GA Lbr Amadeo/2nd Weight Sex Delivery Anes PTL Lv   1 Current              History reviewed. No pertinent past medical history.  Past Surgical History:   Procedure Laterality Date    APPENDECTOMY      LAPAROSCOPIC APPENDECTOMY N/A 2018    Procedure: APPENDECTOMY, LAPAROSCOPIC;  Surgeon: Dmitry Navarro Jr., MD;  Location: [facility] OR;  Service: General;  Laterality: N/A;         (Not in a hospital admission)    Review of patient's allergies indicates:   Allergen Reactions    Shellfish containing products Hives     crabmeat    Codeine Rash        Family History     Problem Relation (Age of Onset)    No Known Problems Mother, Father        Tobacco Use    Smoking status: Current Some Day Smoker    Smokeless tobacco: Never Used    Tobacco comment: Patient reports 1-2 cigarettes/day   Substance and Sexual Activity    Alcohol use: No    Drug use: Yes     Types: Marijuana     Comment: Denies current marijuana use    Sexual activity: Yes     Partners: Male     Review of Systems   Gastrointestinal: Positive for abdominal pain.   Genitourinary: Positive for pelvic pain.      Objective:     Vital Signs (Most Recent):  Temp: 97.6 °F (36.4 °C) (19 1526)  Pulse: 88 (19 1550)  Resp: 16 (19 1550)  BP: (!) 90/53 (19 1645)  SpO2: 95 % (19 1645) Vital Signs (24h Range):  Temp:  [97.6 °F (36.4 °C)-97.9 °F (36.6 °C)] 97.6 °F (36.4 °C)  Pulse:  [] 88  Resp:  [16-20] 16  SpO2:  [94 %-100 %] 95 %  BP: ()/(36-70) 90/53     Weight: 50.8 kg (112 lb)  Body mass index is 17.03 kg/m².    Physical Exam:   Constitutional: She appears well-developed and well-nourished. No distress.    HENT:   Head:  Normocephalic and atraumatic.    Eyes: EOM are normal.    Neck: Normal range of motion.    Cardiovascular: Normal rate.     Pulmonary/Chest: Effort normal. No respiratory distress.        Abdominal: She exhibits no distension. There is tenderness. There is rebound and guarding.             Musculoskeletal: Normal range of motion.       Neurological: She is alert.    Skin: Skin is warm and dry.    Psychiatric: She has a normal mood and affect.            Significant Labs:  Lab Results   Component Value Date    GROUPTRH O POS 12/01/2019       CBC:   Recent Labs   Lab 12/01/19  1530 12/01/19  1558   WBC 17.15*  --    RBC 2.45*  --    HGB 7.7*  --    HCT 24.5* 16*     --    *  --    MCH 31.4*  --    MCHC 31.4*  --      I have personallly reviewed all pertinent lab results from the last 24 hours.

## 2019-12-01 NOTE — ED PROVIDER NOTES
Encounter Date: 2019    SCRIBE #1 NOTE: I, Ally Flores, am scribing for, and in the presence of,  Dominique Alex MD. I have scribed the following portions of the note - Other sections scribed: HPI, ROS.       History     Chief Complaint   Patient presents with    Abdominal Pain     EMS reports pt with abdominal pain was seen here for same last night and dx with ectopic pregnancy and left before treatment was completed.      CC: Abdominal pain    HPI:  This is a 24 y.o.  female with no pertinent PMHx who presents to the Emergency Department complaining of abdominal pain that began yesterday. Pt was evaluated at [facility] ED on , diagnosed with left sided ectopic pregnancy and was given Methotrexate x 1. She presented to the ER last night but left without being seen. She presents today with abdominal pain and light headedness.. She has a PSHx of a laparoscopic appendectomy. Pt is allergic to Codeine.      The history is provided by the patient and a parent.     Review of patient's allergies indicates:   Allergen Reactions    Shellfish containing products Hives     crabmeat    Codeine Rash     History reviewed. No pertinent past medical history.  Past Surgical History:   Procedure Laterality Date    APPENDECTOMY      LAPAROSCOPIC APPENDECTOMY N/A 2018    Procedure: APPENDECTOMY, LAPAROSCOPIC;  Surgeon: Dmitry Navarro Jr., MD;  Location: [facility];  Service: General;  Laterality: N/A;     Family History   Problem Relation Age of Onset    No Known Problems Mother     No Known Problems Father      Social History     Tobacco Use    Smoking status: Current Some Day Smoker    Smokeless tobacco: Never Used    Tobacco comment: Patient reports 1-2 cigarettes/day   Substance Use Topics    Alcohol use: No    Drug use: Yes     Types: Marijuana     Comment: Denies current marijuana use     Review of Systems   Unable to perform ROS: Acuity of condition   Gastrointestinal: Positive for abdominal  pain.   Neurological: Positive for light-headedness.       Physical Exam     Initial Vitals   BP Pulse Resp Temp SpO2   12/01/19 1522 12/01/19 1522 12/01/19 1522 12/01/19 1526 12/01/19 1522   (!) 79/44 101 20 97.6 °F (36.4 °C) 96 %      MAP       --                Physical Exam    Constitutional: She appears well-developed and well-nourished. She is not diaphoretic. No distress.   HENT:   Head: Normocephalic.   Eyes: No scleral icterus.   Pale conjunctiva   Neck: Neck supple.   Cardiovascular: Normal rate and regular rhythm.   Pulmonary/Chest: Breath sounds normal. No respiratory distress.   Abdominal: She exhibits no distension. There is tenderness. There is guarding.   Rigid abdomen, guarding   Neurological: GCS score is 15. GCS eye subscore is 4. GCS verbal subscore is 5. GCS motor subscore is 6.   Skin: Skin is warm and dry.   Psychiatric: She has a normal mood and affect.         ED Course   Critical Care  Date/Time: 12/1/2019 8:12 PM  Performed by: Dominique Alex  Authorized by: Dominique Alex MD   Total critical care time (exclusive of procedural time) : 0 minutes  Comments: Please put in 60 minutes of critical care due to patient having a high risk of circulatory failure, hemorrhagic shock failure.   Separate from teaching and exclusive of procedure and ekg time  Includes:  Time at bedside  Time reviewing test results  Time discussing case with staff  Time documenting the medical record  Time spent with family members  Time spent with consults  Management          Labs Reviewed   CBC W/ AUTO DIFFERENTIAL - Abnormal; Notable for the following components:       Result Value    WBC 17.15 (*)     RBC 2.45 (*)     Hemoglobin 7.7 (*)     Hematocrit 24.5 (*)     Mean Corpuscular Volume 100 (*)     Mean Corpuscular Hemoglobin 31.4 (*)     Mean Corpuscular Hemoglobin Conc 31.4 (*)     Gran # (ANC) 15.0 (*)     Immature Grans (Abs) 0.08 (*)     Gran% 87.5 (*)     Lymph% 9.3 (*)     Mono% 2.4 (*)     All other  components within normal limits   COMPREHENSIVE METABOLIC PANEL - Abnormal; Notable for the following components:    Chloride 113 (*)     CO2 18 (*)     Glucose 173 (*)     Calcium 7.5 (*)     Total Protein 4.7 (*)     Albumin 2.9 (*)     Alkaline Phosphatase 38 (*)     All other components within normal limits   ISTAT PROCEDURE - Abnormal; Notable for the following components:    POC Glucose 172 (*)     POC TCO2 (MEASURED) 20 (*)     POC Hematocrit 16 (*)     All other components within normal limits   PROTIME-INR   APTT   HCG, QUANTITATIVE, PREGNANCY   TYPE & SCREEN - OB PROFILE   SPECIMEN TO PATHOLOGY, SURGERY   ISTAT CHEM8   PREPARE RBC SOFT   PREPARE RBC SOFT          Imaging Results    None          Medical Decision Making:   Initial Assessment:   25 yo F with known left sided ectopic pregnancy, s/p Methotrexate presents with hypotension, abdominal pain, abdominal rigidity. Bedside US + for free fluid. Upon arrival to ED, 2 IVs establised (20g). 1 L bolus started. OB emergently consulted and advised activation of OR team. Labs ordered including type and cross. Patient had transient improvement in BP to 90s/50s after 1 L bolus, became hypotensive prior to going to OR. Emergency blood pack ordered and patient to be transfused 2 units. Verbal and written consent for blood transfusion. She was taken emergently to OR for presumed ruptured ectopic pregnancy.  ED Management:  3:36 PM: Spoke to Dr. Roman (OB/GYN) about patient. Reports patient has ruptured ectopic pregnancy.            Scribe Attestation:   Scribe #1: I performed the above scribed service and the documentation accurately describes the services I performed. I attest to the accuracy of the note.            ED Course as of Dec 02 0005   Sun Dec 01, 2019   1552 Patient's blood pressure 90/54 after 500 cc bolus.  Fluids continue to infuse.  20 gauge I would be to right antecubital fossa, 20 gauge to left wrist.  Bedside ultrasound shows large amount of free  fluid in the abdomen.  Patient consented for blood products.    [LH]   1615 POC Hematocrit(!!): 16 [LH]   1619 Hematocrit 16- emergency release blood pack ordered.    [LH]      ED Course User Index  [LH] Dominique Alex MD                Clinical Impression:       ICD-10-CM ICD-9-CM   1. Ruptured ectopic pregnancy O00.90 633.90   2. Hemoperitoneum due to rupture of left tubal ectopic pregnancy O00.102 633.10    K66.1 568.81     Z98.890 V45.89   4. Ruptured left tubal ectopic pregnancy causing hemoperitoneum O00.102 633.10    K66.1 568.81   5. Anemia associated with acute blood loss D62 285.1     K37 541   7. Abdominal pain, unspecified abdominal location R10.9 789.00   8. Hemoperitoneum K66.1 568.81   9. Hemorrhagic shock R57.8 785.59            Scribe attestation: I, Dominique Alex MD, personally performed the services described in this documentation. All medical record entries made by the scribe were at my direction and in my presence.  I have reviewed the chart and agree that the record reflects my personal performance and is accurate and complete.                 Dominique Alex MD  12/02/19 0005

## 2019-12-01 NOTE — H&P
[facility]  Obstetrics  History & Physical    Patient Name: Asher Brunner  MRN: [id number]  Admission Date: 2019  Primary Care Provider: Primary Doctor No    Subjective:     Principal Problem:Ruptured left tubal ectopic pregnancy causing hemoperitoneum    History of Present Illness:  25 yo G1 at ?6 weeks  History of ectopic pregnancy.  Status post methotrexate at [place] 2019  Came to our Emergency Department yesterday, 2019 with pain  However, she left because the wait was too long    Came back today with pain and lightheadedness      Obstetric HPI:  Known ectopic pregnancy, status post methotrexate on 2019  HCG was 5392  Pelvic ultrasound with left ectopic with fetal heart tones.      OB History    Para Term  AB Living   1 0 0 0 0 0   SAB TAB Ectopic Multiple Live Births   0 0 0 0 0      # Outcome Date GA Lbr Amadeo/2nd Weight Sex Delivery Anes PTL Lv   1 Current              History reviewed. No pertinent past medical history.  Past Surgical History:   Procedure Laterality Date    APPENDECTOMY      LAPAROSCOPIC APPENDECTOMY N/A 2018    Procedure: APPENDECTOMY, LAPAROSCOPIC;  Surgeon: Dmitry Navarro Jr., MD;  Location: [facility];  Service: General;  Laterality: N/A;         (Not in a hospital admission)    Review of patient's allergies indicates:   Allergen Reactions    Shellfish containing products Hives     crabmeat    Codeine Rash        Family History     Problem Relation (Age of Onset)    No Known Problems Mother, Father        Tobacco Use    Smoking status: Current Some Day Smoker    Smokeless tobacco: Never Used    Tobacco comment: Patient reports 1-2 cigarettes/day   Substance and Sexual Activity    Alcohol use: No    Drug use: Yes     Types: Marijuana     Comment: Denies current marijuana use    Sexual activity: Yes     Partners: Male     Review of Systems   Gastrointestinal: Positive for abdominal pain.   Genitourinary: Positive for  pelvic pain.      Objective:     Vital Signs (Most Recent):  Temp: 97.6 °F (36.4 °C) (19 1526)  Pulse: 88 (19 1550)  Resp: 16 (19 1550)  BP: (!) 90/53 (19 1645)  SpO2: 95 % (19 1645) Vital Signs (24h Range):  Temp:  [97.6 °F (36.4 °C)-97.9 °F (36.6 °C)] 97.6 °F (36.4 °C)  Pulse:  [] 88  Resp:  [16-20] 16  SpO2:  [94 %-100 %] 95 %  BP: ()/(36-70) 90/53     Weight: 50.8 kg (112 lb)  Body mass index is 17.03 kg/m².    Physical Exam:   Constitutional: She appears well-developed and well-nourished. No distress.    HENT:   Head: Normocephalic and atraumatic.    Eyes: EOM are normal.    Neck: Normal range of motion.    Cardiovascular: Normal rate.     Pulmonary/Chest: Effort normal. No respiratory distress.        Abdominal: She exhibits no distension. There is tenderness. There is rebound and guarding.             Musculoskeletal: Normal range of motion.       Neurological: She is alert.    Skin: Skin is warm and dry.    Psychiatric: She has a normal mood and affect.            Significant Labs:  Lab Results   Component Value Date    GROUPTRH O POS 2019       CBC:   Recent Labs   Lab 19  1530 19  1558   WBC 17.15*  --    RBC 2.45*  --    HGB 7.7*  --    HCT 24.5* 16*     --    *  --    MCH 31.4*  --    MCHC 31.4*  --      I have personallly reviewed all pertinent lab results from the last 24 hours.    Assessment/Plan:     24 y.o. female  at Unknown for:    * Ruptured left tubal ectopic pregnancy causing hemoperitoneum  Will proceed with laparoscopy, evacuation of hemoperitoneum, removal of ruptured ectopic pregnancy now      Anemia associated with acute blood loss  2 units of pRBCs now.        Delroy Chase MD  Obstetrics  [facility]

## 2019-12-01 NOTE — HOSPITAL COURSE
Exam in the ED by ED staff with orthostatic hypotension  Rigid abdomen  Bedside ultrasound with significant amount of fluid in abdomen    H/H at 12.6/39.4 on 11/26/2019.  Today, H/H at 7.7/24.5    Exam in the ED by me,  Significant rebound tenderness.  BP low at 80/60    Patient underwent laparoscopy, left salpingectomy, evacuation of hemoperitoneum the night of 12/1/2019.    Postoperative course was benign.  She is tolerating oral intake well.  Exam was benign with patient afebrile, vitals stable, and minimal bleeding.  Normal activities.  Patient discharged home on postoperative day #1, 12/2/2019  Discharge medications include Percocet, Motrin with iron supplement  Follow-up with me in 2 weeks.    Delroy Chase MD.

## 2019-12-02 VITALS
DIASTOLIC BLOOD PRESSURE: 58 MMHG | RESPIRATION RATE: 17 BRPM | HEART RATE: 95 BPM | SYSTOLIC BLOOD PRESSURE: 102 MMHG | HEIGHT: 68 IN | WEIGHT: 112 LBS | BODY MASS INDEX: 16.97 KG/M2 | OXYGEN SATURATION: 98 % | TEMPERATURE: 97 F

## 2019-12-02 LAB
BLD PROD TYP BPU: NORMAL
BLD PROD TYP BPU: NORMAL
BLOOD UNIT EXPIRATION DATE: NORMAL
BLOOD UNIT EXPIRATION DATE: NORMAL
BLOOD UNIT TYPE CODE: 5100
BLOOD UNIT TYPE CODE: 5100
BLOOD UNIT TYPE: NORMAL
BLOOD UNIT TYPE: NORMAL
CODING SYSTEM: NORMAL
CODING SYSTEM: NORMAL
DISPENSE STATUS: NORMAL
DISPENSE STATUS: NORMAL
TRANS ERYTHROCYTES VOL PATIENT: NORMAL ML
TRANS ERYTHROCYTES VOL PATIENT: NORMAL ML

## 2019-12-02 PROCEDURE — 25000003 PHARM REV CODE 250: Performed by: OBSTETRICS & GYNECOLOGY

## 2019-12-02 RX ORDER — OXYCODONE AND ACETAMINOPHEN 5; 325 MG/1; MG/1
1 TABLET ORAL EVERY 4 HOURS PRN
Qty: 12 TABLET | Refills: 0 | Status: SHIPPED | OUTPATIENT
Start: 2019-12-02

## 2019-12-02 RX ADMIN — IBUPROFEN 600 MG: 600 TABLET, FILM COATED ORAL at 12:12

## 2019-12-02 RX ADMIN — IBUPROFEN 600 MG: 600 TABLET, FILM COATED ORAL at 05:12

## 2019-12-02 RX ADMIN — OXYCODONE AND ACETAMINOPHEN 1 TABLET: 10; 325 TABLET ORAL at 12:12

## 2019-12-02 RX ADMIN — OXYCODONE AND ACETAMINOPHEN 1 TABLET: 10; 325 TABLET ORAL at 05:12

## 2019-12-02 NOTE — TRANSFER OF CARE
"Anesthesia Transfer of Care Note    Patient: Asher Brunner    Procedure(s) Performed: Procedure(s) (LRB):  REMOVAL, ECTOPIC PREGNANCY, LAPAROSCOPIC (Left)  LAPAROSCOPY, DIAGNOSTIC    Patient location: PACU    Anesthesia Type: general    Transport from OR: Transported from OR on room air with adequate spontaneous ventilation    Post pain: adequate analgesia    Post assessment: no apparent anesthetic complications and tolerated procedure well    Post vital signs: stable    Level of consciousness: awake    Nausea/Vomiting: no nausea/vomiting    Complications: none    Transfer of care protocol was followed      Last vitals:   Visit Vitals  /79 (BP Location: Right arm, Patient Position: Lying)   Pulse 109   Temp 36.4 °C (97.5 °F) (Oral)   Resp 18   Ht 5' 8" (1.727 m)   Wt 50.8 kg (112 lb)   LMP 12/25/2018 (Approximate)   SpO2 100%   Breastfeeding? No   BMI 17.03 kg/m²     "

## 2019-12-02 NOTE — DISCHARGE SUMMARY
DISCHARGE SUMMARY    Date of Admission: 12/1/2019  Date of Discharge: 12/2/2019    Diagnoses on Discharge:   Status post left salpingectomy for ruptured ectopic pregnancy.  Evacuation of large hemoperitoneum.    History:   23 yo G1 at ?6 weeks  History of ectopic pregnancy.  Status post methotrexate at [place] 11/26/2019  Came to our Emergency Department yesterday, 11/30/2019 with pain  However, she left because the wait was too long     Came back today with pain and lightheadedness    OPERATIVE NOTES      Date of Procedure: 12/1/2019     Preoperative Diagnoses:  1.  Ruptured ectopic pregnancy  2.  Status post laparoscopic appendectomy for ruptured appendix     Postoperative Diagnoses:  1.  Ruptured ectopic pregnancy  2.  Status post laparoscopic appendectomy for ruptured appendix  3.  Large hemoperitoneum  4.  Pelvic adhesions     Procedures:  1.  Laparoscopy  2.  Evacuation of hemoperitoneum  3.  Resection of left cornual ectopic pregnancy     Surgeon: MD Salvatore  Assistant: None     Anesthesia: GETA   EBL: 75 cc     Findings:  1.  Large amount of blood and clots in peritoneal cavity.  About 1600 cc of blood and clots removed from peritoneal cavity.  2.  Left tubal ectopic pregnancy.  Very close to the cornua of uterus.  Actively bleeding.     Complications:  None     Specimen:  1.  Left tube with ectopic pregnancy.     ** Two units of pRBCs transfused during and after surgery.    Postoperative course was benign.  She is tolerating oral intake well.  Exam was benign with patient afebrile, vitals stable, and minimal bleeding.  Normal activities.   Regular diet  Patient discharged home on postoperative day #1, 12/2/2019  Discharge medications include Motrin, and iron supplement.  Follow-up with me in 2 weeks.    Delroy Chase MD.

## 2019-12-02 NOTE — BRIEF OP NOTE
OPERATIVE NOTES     Date of Procedure: 12/1/2019    Preoperative Diagnoses:  1.  Ruptured ectopic pregnancy  2.  Status post laparoscopic appendectomy for ruptured appendix    Postoperative Diagnoses:  1.  Ruptured ectopic pregnancy  2.  Status post laparoscopic appendectomy for ruptured appendix  3.  Large hemoperitoneum  4.  Pelvic adhesions    Procedures:  1.  Laparoscopy  2.  Evacuation of hemoperitoneum  3.  Resection of left cornual ectopic pregnancy    Surgeon: MD Salvatore  Assistant: None    Anesthesia: GETA   EBL: 75 cc    Findings:  1.  Large amount of blood and clots in peritoneal cavity.  About 1600 cc of blood and clots removed from peritoneal cavity.  2.  Left tubal ectopic pregnancy.  Very close to the cornua of uterus.  Actively bleeding.    Complications:  None    Specimen:  1.  Left tube with ectopic pregnancy.    History:   History of Present Illness:  25 yo G1 at ?6 weeks  History of ectopic pregnancy.  Status post methotrexate at [place] 11/26/2019  Came to our Emergency Department yesterday, 11/30/2019 with pain  However, she left because the wait was too long     Came back today with pain and lightheadedness    Delroy Chase MD

## 2019-12-02 NOTE — OP NOTE
OPERATIVE NOTES      Date of Procedure: 12/1/2019     Preoperative Diagnoses:  1.  Ruptured ectopic pregnancy  2.  Status post laparoscopic appendectomy for ruptured appendix     Postoperative Diagnoses:  1.  Ruptured ectopic pregnancy  2.  Status post laparoscopic appendectomy for ruptured appendix  3.  Large hemoperitoneum  4.  Pelvic adhesions     Procedures:  1.  Laparoscopy  2.  Evacuation of hemoperitoneum  3.  Resection of left cornual ectopic pregnancy     Surgeon: MD Salvatore  Assistant: None     Anesthesia: GETA   EBL: 75 cc     Findings:  1.  Large amount of blood and clots in peritoneal cavity.  About 1600 cc of blood and clots removed from peritoneal cavity.  2.  Left tubal ectopic pregnancy.  Very close to the cornua of uterus.  Actively bleeding.     Complications:  None     Specimen:  1.  Left tube with ectopic pregnancy.     History:   History of Present Illness:  23 yo G1 at ?6 weeks  History of ectopic pregnancy.  Status post methotrexate at [place] 11/26/2019  Came to our Emergency Department yesterday, 11/30/2019 with pain  However, she left because the wait was too long     Came back today with pain and lightheadedness    PROCEDURE IN DETAILS    After confirming appropriate consents were signed and in chart, patient was taking to the operating room where general anesthesia was administered and found to be adequate. She was prepped and draped in the dorsal lithotomy position.     A 5 mm supraumbilical skin incision was made with the scalpel.  A Veress needle was inserted into the umbilicus under tenting of the anterior abdominal wall. Placement into the peritoneal cavity was confirmed via saline drop test. The abdomen was insufflated to about 15mm Hg using Carbon dioxide. . A 5 mm trocar was advanced through this incision. Excellent hemostasis was noted. The patient was placed in deep Trendelenburg. Findings as above.    A 10 mm left lateral skin incision was made with a scalpel and a 10 mm trocar  was advanced through this incision under visualization of the camera. A 5 mm right lateral skin incision was made with the scalpel. A 5 mm trocar was advanced through this incision under visualization of the camera. Excellent hemostasis was noted.      Photographs taken prior to resection.    We began with evacuation of hemoperitoneum before we could properly identify the uterus and ectopic pregnancy.    Attention was turned to the left adnexa. The left tube was picked up with tissue forceps.  The EnSeal device was used to excise the middle of left tube from it ipsilateral ovary and uterus.  Good hemostasis noted.  The fimbriated end left intact; it appeared to be adherent to the pelvic sidewall.  EndoBag was placed through the 10 mm trochar site.  The specimen placed into the pouch and removed through the 10 mm port.      Tubal pregnancy was sent to Pathology.    Good hemostasis noted throughout.    We then proceeded with placing the patient in reverse Trendelenburg to remove more non-clotting blood.  A total of 1600 cc of blood and clots aspirated out of her peritoneal cavity.    Attention was turned to the anterior abdominal wall. The abdomen was deflated and all trocars were removed. The 10 mm port site was repaired using 2.0 vicryl.  The skin was closed with 3.0 Vicryl in a subcuticular fashion for all trochar sites. SteriStrips and Bandaid applied to trochar sites.  Sponge, lap, and needle counts were correct x 2. The patient was taken to the recovery room in stable condition with the kline draining urine.

## 2019-12-02 NOTE — PROGRESS NOTES
Patient tolerated breakfast well, bowel sounds present. Adequate pain control. Discharge instructions and prescription given to patient. IVs removed per RN. Discharged to mother's vehicle via wheelchair. CROW

## 2019-12-02 NOTE — PROGRESS NOTES
Pt ambulated to  with B [place], PCT w/o difficulty. Upon return to bed pt began complaining of pain and breathing rapidly. Pt visibly anxious.Vitals signs  WNL aside from elevated HR. Pulse ox 98% on room air. Pt instructed to breathe deeply and calmly. VItals returned to normal within 2 minutes of RN entering room and pt visibly calm. Abdomen soft. Scant vaginal bleeding. Will continue to monitor. Instructed to call for assist ambulating.

## 2019-12-02 NOTE — ASSESSMENT & PLAN NOTE
Patient discharged home on postoperative day #1, 12/2/2019  Discharge medications include Percocet, Motrin with iron supplement  Follow-up with me in 2 weeks.

## 2019-12-02 NOTE — ANESTHESIA POSTPROCEDURE EVALUATION
Anesthesia Post Evaluation    Patient: Asher Brunner    Procedure(s) Performed: Procedure(s) (LRB):  REMOVAL, ECTOPIC PREGNANCY, LAPAROSCOPIC (Left)  LAPAROSCOPY, DIAGNOSTIC    Final Anesthesia Type: general    Patient location during evaluation: PACU  Patient participation: Yes- Able to Participate  Level of consciousness: awake and alert and oriented  Post-procedure vital signs: reviewed and stable  Pain management: adequate  Airway patency: patent    PONV status at discharge: No PONV  Anesthetic complications: no      Cardiovascular status: blood pressure returned to baseline, hemodynamically stable and stable  Respiratory status: unassisted, spontaneous ventilation and room air  Hydration status: euvolemic  Follow-up not needed.          Vitals Value Taken Time   /62 12/1/2019  7:03 PM   Temp 36.6 °C (97.8 °F) 12/1/2019  7:15 PM   Pulse 72 12/1/2019  7:15 PM   Resp 17 12/1/2019  7:15 PM   SpO2 100 % 12/1/2019  7:15 PM         Event Time     Out of Recovery 19:17:33          Pain/Savanna Score: Pain Rating Prior to Med Admin: 4 (12/1/2019  7:05 PM)  Pain Rating Post Med Admin: 4 (12/1/2019  6:56 PM)  Savanna Score: 9 (12/1/2019  7:05 PM)

## 2019-12-02 NOTE — PLAN OF CARE
VSS with exception of one episode which pt attributes to anxiety secondary to pain with ambulation. NAD at this time. Free from falls. Repeat CBC in AM. Discussed POC, pain management and labs. Probable d/c. Pt verbalizes understanding.

## 2019-12-04 ENCOUNTER — TELEPHONE (OUTPATIENT)
Dept: OBSTETRICS AND GYNECOLOGY | Facility: CLINIC | Age: 24
End: 2019-12-04

## 2019-12-04 LAB
FINAL PATHOLOGIC DIAGNOSIS: NORMAL
GROSS: NORMAL

## 2019-12-04 NOTE — TELEPHONE ENCOUNTER
12/4/19 @ 1049  SPOKE WITH MS PATEL, INFORMED HER THAT DR RODRIGUEZ IS FULLY BOOKED UNTIL 12/19 , PT STATED SHE HAS TO BE SEEN BEFORE 12/8 BECAUSE SHE HAS TO GO BACK TO [place],  INFORMED PT THAT I WILL SEND MESSAGE TO [facility] TO SEE IF SHE CAN FIT HER IN SOONER, MESSAGE FORWARDED TO [facility]      ----- Message from Emmie Church sent at 12/4/2019 11:34 AM CST -----  Contact: Patient ph [phone]  Type: Patient Call Back    Who called: Patient    What is the request in detail: Need a follow up from surgery she had done on 12-02-19. Please call pt to set this up.    Would the patient rather a call back or a response via [facility]?  Call back    Best call back number: [phone]

## 2019-12-06 NOTE — TELEPHONE ENCOUNTER
Called pt to offer an appt for follow up. Pt states that she live out of town and won't be back.  Pt was advised to go to Medical Records to get records for her OB/GYN in her hometown to review and to make sure she heals properly. Pt voiced understanding. Dr. Chase has been informed. jtn

## 2019-12-09 ENCOUNTER — TELEPHONE (OUTPATIENT)
Dept: OBSTETRICS AND GYNECOLOGY | Facility: CLINIC | Age: 24
End: 2019-12-09

## 2019-12-09 NOTE — TELEPHONE ENCOUNTER
12/9/19 @ 0833  THIS PT WAS NOTIFIED BY [facility]YANI WOLF MA. ON ::  FARNAZ Ontiveros  You 3 days ago         Called pt to offer an appt for follow up. Pt states that she live out of town and won't be back.  Pt was advised to go to Medical Records to get records for her OB/GYN in her hometown to review and to make sure she heals properly. Pt voiced understanding. Dr. Chase has been informed. jtn         Documentation

## 2020-02-24 ENCOUNTER — HOSPITAL ENCOUNTER (EMERGENCY)
Facility: HOSPITAL | Age: 25
Discharge: HOME | End: 2020-02-24
Attending: EMERGENCY MEDICINE
Payer: MEDICAID

## 2020-02-24 VITALS
TEMPERATURE: 98 F | HEIGHT: 68 IN | HEART RATE: 75 BPM | RESPIRATION RATE: 19 BRPM | SYSTOLIC BLOOD PRESSURE: 128 MMHG | BODY MASS INDEX: 16.97 KG/M2 | OXYGEN SATURATION: 98 % | WEIGHT: 112 LBS | DIASTOLIC BLOOD PRESSURE: 62 MMHG

## 2020-02-24 DIAGNOSIS — N93.8 DUB (DYSFUNCTIONAL UTERINE BLEEDING): Primary | ICD-10-CM

## 2020-02-24 LAB
B-HCG UR QL: NEGATIVE
BILIRUB UR QL STRIP: NEGATIVE
CLARITY UR: CLEAR
COLOR UR: YELLOW
CTP QC/QA: YES
GLUCOSE UR QL STRIP: NEGATIVE
HGB UR QL STRIP: ABNORMAL
KETONES UR QL STRIP: ABNORMAL
LEUKOCYTE ESTERASE UR QL STRIP: NEGATIVE
NITRITE UR QL STRIP: NEGATIVE
PH UR STRIP: 6 [PH] (ref 5–8)
PROT UR QL STRIP: ABNORMAL
SP GR UR STRIP: >=1.03 (ref 1–1.03)
URN SPEC COLLECT METH UR: ABNORMAL
UROBILINOGEN UR STRIP-ACNC: NEGATIVE EU/DL

## 2020-02-24 PROCEDURE — 99283 EMERGENCY DEPT VISIT LOW MDM: CPT

## 2020-02-24 PROCEDURE — 81025 URINE PREGNANCY TEST: CPT | Performed by: NURSE PRACTITIONER

## 2020-02-24 PROCEDURE — 81003 URINALYSIS AUTO W/O SCOPE: CPT

## 2020-02-24 NOTE — MEDICAL/APP STUDENT
History     Chief Complaint   Patient presents with    Vaginal Bleeding     PT presents to ED today c/o irregular menses and pelvic pain. Pt reports LMP oon / x 5 days then started again today. PT reports tubal pregnancy in December      Ms Brunner is a [id number] (ectopic, s/p emergent left salpingectomy 19 for hemoperitoneum 2/2 ruptured ectopic) presents with abnormal menses. Says menses usually regular (been normal since aforementioned surgery), LMP began  and finished  (usually 6d, 2-3 tampons/dy, this period was somewhat longer for her), but then she had more light vaginal bleeding today that was out of the norm for her. Notes mild cramping as well today, has only used 1 tampon today. Denies any vaginal discharge/odor, notes chronic dyspareunia unchanged today, no dysuria/hematuria, mild pelvic cramping as above, no fever/chills, no chest pain, no SOB.     PMHx of ectopic as above,   SHx with left emergent salpingectomy 19 for ruptured ectopic pregnancy, s/p lap-appy 2018  [id number], sexually active with 2 male partners in last 6mo (only one partner currently), and she does not use protection with her current partner, says she feels safe with this partner, no STI history noted.          No past medical history on file.    Past Surgical History:   Procedure Laterality Date    APPENDECTOMY      DIAGNOSTIC LAPAROSCOPY  2019    Procedure: LAPAROSCOPY, DIAGNOSTIC;  Surgeon: Delroy Chase MD;  Location: [facility] OR;  Service: OB/GYN;;    LAPAROSCOPIC APPENDECTOMY N/A 2018    Procedure: APPENDECTOMY, LAPAROSCOPIC;  Surgeon: Dmitry Navarro Jr., MD;  Location: [facility] OR;  Service: General;  Laterality: N/A;    LAPAROSCOPIC TREATMENT OF ECTOPIC PREGNANCY Left 2019    Procedure: REMOVAL, ECTOPIC PREGNANCY, LAPAROSCOPIC;  Surgeon: Delroy Chase MD;  Location: [facility] OR;  Service: OB/GYN;  Laterality: Left;       Family History   Problem Relation Age of Onset    No Known Problems  "Mother     No Known Problems Father        Social History     Tobacco Use    Smoking status: Current Some Day Smoker    Smokeless tobacco: Never Used    Tobacco comment: Patient reports 1-2 cigarettes/day   Substance Use Topics    Alcohol use: No    Drug use: Yes     Types: Marijuana     Comment: Denies current marijuana use       Review of Systems   Constitutional: Negative for activity change, appetite change, fatigue and fever.   HENT: Negative for congestion, ear discharge, facial swelling, rhinorrhea and sinus pain.    Eyes: Negative for discharge and itching.   Respiratory: Negative for cough, shortness of breath and wheezing.    Cardiovascular: Negative for chest pain and leg swelling.   Gastrointestinal: Negative for abdominal pain, constipation, diarrhea, nausea and vomiting.   Endocrine: Negative for polydipsia and polyphagia.   Genitourinary: Positive for dyspareunia, menstrual problem, pelvic pain, vaginal bleeding and vaginal pain. Negative for difficulty urinating, dysuria, hematuria, urgency and vaginal discharge.   Musculoskeletal: Negative for arthralgias, back pain and myalgias.   Skin: Negative for color change and pallor.   Neurological: Negative for dizziness, tremors, seizures and numbness.   Psychiatric/Behavioral: Negative for agitation and behavioral problems.       Physical Exam   /69   Pulse 75   Temp 98.4 °F (36.9 °C) (Oral)   Resp 19   Ht 5' 8" (1.727 m)   Wt 50.8 kg (112 lb)   SpO2 98%   Breastfeeding? Unknown   BMI 17.03 kg/m²     Physical Exam    Constitutional: She appears well-developed and well-nourished. She is not diaphoretic. No distress.   HENT:   Head: Normocephalic and atraumatic.   Eyes: EOM are normal. Pupils are equal, round, and reactive to light. No scleral icterus.   Neck: Normal range of motion. Neck supple.   Cardiovascular: Normal rate, regular rhythm and normal heart sounds. Exam reveals no gallop and no friction rub.    No murmur " heard.  Pulmonary/Chest: Breath sounds normal. No respiratory distress. She has no wheezes. She has no rales.   Abdominal: Soft. Bowel sounds are normal. She exhibits no distension.       Musculoskeletal: Normal range of motion. She exhibits no edema or tenderness.   Neurological: She is alert and oriented to person, place, and time. GCS score is 15. GCS eye subscore is 4. GCS verbal subscore is 5. GCS motor subscore is 6.   Skin: Skin is warm and dry. Capillary refill takes less than 2 seconds.   Psychiatric: She has a normal mood and affect. Thought content normal.     Pelvic exam deferred to attending physician at patient's insistence    ED Course     Ms Brunner is a 24yoF who presents with irregular menstrual period for this month but with no features of menorrhagia, UTI, STI. CBC to ensure HB is WNL relative to last ectopic pregnancy salpingectomy. Likely normal menstrual period. Pelvic exam to be performed by Attending Dr. Novak.

## 2020-02-24 NOTE — ED NOTES
APPEARANCE: Alert, oriented and in no acute distress.  CARDIAC: Normal rate and rhythm  PERIPHERAL VASCULAR: peripheral pulses present. Normal cap refill. No edema. Warm to touch.    RESPIRATORY:Respirations are equal and unlabored no obvious signs of distress.  GASTRO: soft, bowel sounds normal, no tenderness, no abdominal distention.  MUSC: Full ROM. No bony tenderness or soft tissue tenderness. No obvious deformity.  SKIN: Skin is warm and dry, normal skin turgor, mucous membranes moist.  NEURO: 5/5 strength major flexors/extensors bilaterally. Sensory intact to light touch bilaterally. [place] coma scale: eyes open spontaneously-4, oriented & converses-5, obeys commands-6. No neurological abnormalities.   MENTAL STATUS: awake, alert and aware of environment.  EYE: PERRL, both eyes:   GENITALIA: reports heavy vaginal bleeding that began today

## 2020-02-24 NOTE — ED PROVIDER NOTES
Encounter Date: 2020    SCRIBE #1 NOTE: I, Michelle Ahumada, am scribing for, and in the presence of,  Dr. Novak. I have scribed the entire note.       History     Chief Complaint   Patient presents with    Vaginal Bleeding     PT presents to ED today c/o irregular menses and pelvic pain. Pt reports LMP oon / x 5 days then started again today. PT reports tubal pregnancy in December      Asher Brunner is a [id number] (ectopic, s/p emergent left salpingectomy 19 for hemoperitoneum 2/2 ruptured ectopic) presents with abnormal menses. Says menses usually regular (been normal since aforementioned surgery), LMP began  and finished  (usually 6d, 2-3 tampons/dy, this period was somewhat longer for her), but then she had more light vaginal bleeding today that was out of the norm for her. Notes mild cramping as well today, has only used 1 tampon today. Denies any vaginal discharge/odor, notes chronic dyspareunia unchanged today, no dysuria/hematuria, mild pelvic cramping as above, no fever/chills, no chest pain, no SOB.    The history is provided by the patient.     Review of patient's allergies indicates:   Allergen Reactions    Shellfish containing products Hives     crabmeat    Codeine Rash     No past medical history on file.  Past Surgical History:   Procedure Laterality Date    APPENDECTOMY      DIAGNOSTIC LAPAROSCOPY  2019    Procedure: LAPAROSCOPY, DIAGNOSTIC;  Surgeon: Delroy Chase MD;  Location: [facility] OR;  Service: OB/GYN;;    LAPAROSCOPIC APPENDECTOMY N/A 2018    Procedure: APPENDECTOMY, LAPAROSCOPIC;  Surgeon: Dmitry Navarro Jr., MD;  Location: [facility] OR;  Service: General;  Laterality: N/A;    LAPAROSCOPIC TREATMENT OF ECTOPIC PREGNANCY Left 2019    Procedure: REMOVAL, ECTOPIC PREGNANCY, LAPAROSCOPIC;  Surgeon: Delroy Chase MD;  Location: [facility] OR;  Service: OB/GYN;  Laterality: Left;     Family History   Problem Relation Age of Onset    No Known Problems Mother      No Known Problems Father      Social History     Tobacco Use    Smoking status: Current Some Day Smoker    Smokeless tobacco: Never Used    Tobacco comment: Patient reports 1-2 cigarettes/day   Substance Use Topics    Alcohol use: No    Drug use: Yes     Types: Marijuana     Comment: Denies current marijuana use     Review of Systems   Constitutional: Negative for chills and fever.   HENT: Negative for congestion, rhinorrhea and sore throat.    Eyes: Negative for redness and visual disturbance.   Respiratory: Negative for cough, shortness of breath and wheezing.    Cardiovascular: Negative for chest pain and palpitations.   Gastrointestinal: Negative for abdominal pain, diarrhea, nausea and vomiting.   Genitourinary: Positive for dyspareunia, menstrual problem, pelvic pain, vaginal bleeding and vaginal pain. Negative for dysuria and hematuria.   Musculoskeletal: Negative for back pain, myalgias and neck pain.   Skin: Negative for rash.   Neurological: Negative for dizziness, weakness and light-headedness.   Psychiatric/Behavioral: Negative for confusion.   All other systems reviewed and are negative.      Physical Exam     Initial Vitals [02/24/20 1404]   BP Pulse Resp Temp SpO2   129/69 75 19 98.4 °F (36.9 °C) 98 %      MAP       --         Physical Exam    Nursing note and vitals reviewed.  Constitutional: She appears well-developed and well-nourished. No distress.   HENT:   Head: Normocephalic and atraumatic.   Mouth/Throat: Oropharynx is clear and moist.   Eyes: Conjunctivae and EOM are normal. Pupils are equal, round, and reactive to light.   Neck: Normal range of motion. Neck supple. No stridor present. No tracheal deviation present.   Cardiovascular: Normal rate, regular rhythm and normal heart sounds.   No murmur heard.  Pulmonary/Chest: Breath sounds normal. No respiratory distress.   Abdominal: Soft. Bowel sounds are normal. There is tenderness. There is no rebound and no guarding.   Suprapubic  tenderness with no rebound or guarding   Genitourinary: No vaginal discharge found.   Genitourinary Comments: No CMT. No adnexal or uterine tenderness. No bleeding noted.   Musculoskeletal: Normal range of motion. She exhibits no edema or tenderness.   Neurological: She is alert and oriented to person, place, and time. No sensory deficit.   Skin: Skin is warm and dry. Capillary refill takes less than 2 seconds.   Psychiatric: She has a normal mood and affect. Her behavior is normal.         ED Course   Procedures  Labs Reviewed   URINALYSIS - Abnormal; Notable for the following components:       Result Value    Specific Gravity, UA >=1.030 (*)     Protein, UA Trace (*)     Ketones, UA Trace (*)     Occult Blood UA Trace (*)     All other components within normal limits   POCT URINE PREGNANCY             Medical Decision Making:   Clinical Tests:   Lab Tests: Ordered and Reviewed  ED Management:  5:44 PM  Patient doing well, in NAD. Non-toxic in appearance. UPT is negative with no evidence of vaginal bleeding on PE.                                  Clinical Impression:     1. DUB (dysfunctional uterine bleeding)        Disposition:   Disposition: Discharged  Condition: Stable      I, Dr. Rahul Novak, personally performed the services described in this documentation. All medical record entries made by the scribe were at my direction and in my presence.  I have reviewed the chart and agree that the record reflects my personal performance and is accurate and complete               Rahul Novak MD  02/24/20 [id number]

## 2020-02-24 NOTE — ED NOTES
Pt presents to the ED c/o heavy bleeding and abdominal pain that began on today.  LMP 2/8/20.  Pt reports tubal pregnancy in December.

## 2020-05-08 ENCOUNTER — OFFICE VISIT (OUTPATIENT)
Dept: URGENT CARE | Facility: CLINIC | Age: 25
End: 2020-05-08
Payer: MEDICAID

## 2020-05-08 VITALS
TEMPERATURE: 99 F | OXYGEN SATURATION: 99 % | HEART RATE: 114 BPM | WEIGHT: 115 LBS | HEIGHT: 68 IN | BODY MASS INDEX: 17.43 KG/M2

## 2020-05-08 DIAGNOSIS — L03.115 CELLULITIS OF RIGHT FOOT: Primary | ICD-10-CM

## 2020-05-08 DIAGNOSIS — L08.9 INFECTED ULCER OF SKIN, UNSPECIFIED ULCER STAGE: ICD-10-CM

## 2020-05-08 DIAGNOSIS — L98.499 INFECTED ULCER OF SKIN, UNSPECIFIED ULCER STAGE: ICD-10-CM

## 2020-05-08 PROCEDURE — 99203 OFFICE O/P NEW LOW 30 MIN: CPT | Mod: S$GLB,,, | Performed by: PHYSICIAN ASSISTANT

## 2020-05-08 PROCEDURE — 99203 PR OFFICE/OUTPT VISIT, NEW, LEVL III, 30-44 MIN: ICD-10-PCS | Mod: S$GLB,,, | Performed by: PHYSICIAN ASSISTANT

## 2020-05-08 RX ORDER — MUPIROCIN 20 MG/G
OINTMENT TOPICAL
Qty: 22 G | Refills: 0 | Status: SHIPPED | OUTPATIENT
Start: 2020-05-08

## 2020-05-08 RX ORDER — CLINDAMYCIN HYDROCHLORIDE 300 MG/1
300 CAPSULE ORAL 4 TIMES DAILY
Qty: 28 CAPSULE | Refills: 0 | Status: SHIPPED | OUTPATIENT
Start: 2020-05-08 | End: 2020-05-15

## 2020-05-08 NOTE — PATIENT INSTRUCTIONS
- Rest.    - Drink plenty of fluids.    - Acetaminophen (tylenol) or Ibuprofen (advil,motrin) as directed as needed for fever/pain. Avoid tylenol if you have a history of liver disease. Do not take ibuprofen if you have a history of GI bleeding, kidney disease, or if you take blood thinners.     - Keep the wound clean. You can wash gently with soap and water.   - Change dressing frequently; apply mupirocin antibiotic ointment as directed.  - No soaking the wound (No swimming, no bath, no dishwashing).    - You have been given an antibiotic (clindamycin) to treat your condition today.    - Please complete the antibiotic as directed on the bottle.   - If you are female and on oral birth control pills, use additional methods to prevent pregnancy while on antibiotics and for one cycle after.   - you can take over-the-counter probiotics during and after antibiotic use to help preserve gut mathew and reduce gastrointestinal symptoms  -Please take (clindamycin) with food as this medication may cause upset stomach    - Follow up with your PCP or specialty clinic as directed in the next 1-2 weeks if not improved or as needed.  You can call [phone] to schedule an appointment with the appropriate provider.    - Go to the ER or seek medical attention immediately if you develop new or worsening symptoms.    - You must understand that you have received an Urgent Care treatment only and that you may be released before all of your medical problems are known or treated.   - You, the patient, will arrange for follow up care as instructed.   - If your condition worsens or fails to improve we recommend that you receive another evaluation at the ER immediately or contact your PCP to discuss your concerns or return here.       Discharge Instructions for Cellulitis  You have been diagnosed with cellulitis. This is an infection in the deepest layer of the skin. In some cases, the infection also affects the muscle. Cellulitis is  caused by bacteria. The bacteria can enter the body through broken skin. This can happen with a cut, scratch, animal bite, or an insect bite that has been scratched. You may have been treated in the hospital with antibiotics and fluids. You will likely be given a prescription for antibiotics to take at home. This sheet will help you take care of yourself at home.  Home care  When you are home:  · Take the prescribed antibiotic medicine you are given as directed until it is gone. Take it even if you feel better. It treats the infection and stops it from returning. Not taking all the medicine can make future infections hard to treat.  · Keep the infected area clean.  · When possible, raise the infected area above the level of your heart. This helps keep swelling down.  · Talk with your healthcare provider if you are in pain. Ask what kind of over-the-counter medicine you can take for pain.  · Apply clean bandages as advised.  · Take your temperature once a day for a week.  · Wash your hands often to prevent spreading the infection.  In the future, wash your hands before and after you touch cuts, scratches, or bandages. This will help prevent infection.   When to call your healthcare provider  Call your healthcare provider immediately if you have any of the following:  · Difficulty or pain when moving the joints above or below the infected area  · Discharge or pus draining from the area  · Fever of 100.4°F (38°C) or higher, or as directed by your healthcare provider  · Pain that gets worse in or around the infected   · Redness that gets worse in or around the infected area, particularly if the area of redness expands to a wider area  · Shaking chills  · Swelling of the infected area  · Vomiting   Date Last Reviewed: 8/1/2016  © 4148-2872 [employer]. [address] [place] [address]. All rights reserved. This information is not intended as a substitute for professional medical care. Always follow  your healthcare professional's instructions.        Wound Check, Infection  You have a wound that has become infected. The wound will not heal properly unless the infection is cleared. Infection in a wound may also spread if it is not treated. In most cases, antibiotic medicines are prescribed to treat a wound infection.   Symptoms of a wound infection include:  · Redness or swelling around the wound  · Warmth coming from the wound  · New or worsening pain  · Red streaks around the wound  · Draining pus  · Fever  Home care  Follow all directions you are given to treat the infection.  Medicines  Take all medicines as prescribed.   · If you were given antibiotics, take them until they are gone or your healthcare provider tells you to stop. It is vital to finish the antibiotics even if you feel better. If you do not finish them, the infection may come back and be harder to treat.  · If your infection is not responding to the medicines you are taking, you may be prescribed new medicines.  · Take medicine for pain as directed by your healthcare provider.  Wound care  Care for your wound as directed by your healthcare provider.  · Apply a warm compress (clean cloth soaked in hot water) to the infected area for about 5 to 10 minutes at a time. Be very careful not to burn yourself. Test the cloth on a non-infected area to make sure it is not too hot.  · Continue to change the dressing daily. If it becomes wet, stained with wound fluid, or dirty, change it sooner. To change it:  ¨ Wash your hands with soap and water before changing the dressing.  ¨ Carefully remove the dressing and tape. If it sticks to the wound, you may need to wet it a little to remove it. (Do not do this if your healthcare provider has told you not to.)  ¨ Gently clean the wound with clean water (or saline) using gauze, a clean washcloth, or cotton swab.  ¨ Do not use soap, alcohol, peroxide or other cleansers.  ¨ If you were told to dry the wound before  putting on a new dressing, gently pat. Do not rub.  ¨ Throw out the old dressing.  ¨ Wash your hands again before opening the new, clean dressing.  ¨ Wash your hands again when you are done.  Follow-up care  Follow up with your healthcare provider as advised. If a culture was done, you will be notified if your treatment needs to change. Call as directed for the results.  When to seek medical advice  Call your health care provider right away if any of these occur:  · Symptoms of infection don't start to improve within 2 days of starting antibiotics  · Symptoms of infection get worse  · New symptoms, such as red streaks around the wound  · Fever of 100.4°F (38.0°C) or higher for more than 2 days after starting the antibiotics  Date Last Reviewed: 8/10/2015  © 0785-4389 [employer]. [address] [place] [address]. All rights reserved. This information is not intended as a substitute for professional medical care. Always follow your healthcare professional's instructions.        Cellulitis  Cellulitis is an infection of the deep layers of skin. A break in the skin, such as a cut or scratch, can let bacteria under the skin. If the bacteria get to deep layers of the skin, it can be serious. If not treated, cellulitis can get into the bloodstream and lymph nodes. The infection can then spread throughout the body. This causes serious illness.  Cellulitis causes the affected skin to become red, swollen, warm, and sore. The reddened areas have a visible border. An open sore may leak fluid (pus). You may have a fever, chills, and pain.  Cellulitis is treated with antibiotics taken for 7 to 10 days. An open sore may be cleaned and covered with cool wet gauze. Symptoms should get better 1 to 2 days after treatment is started. Make sure to take all the antibiotics for the full number of days until they are gone. Keep taking the medicine even if your symptoms go away.  Home care  Follow these  tips:  · Limit the use of the part of your body with cellulitis.   · If the infection is on your leg, keep your leg raised while sitting. This will help to reduce swelling.  · Take all of the antibiotic medicine exactly as directed until it is gone. Do not miss any doses, especially during the first 7 days. Dont stop taking the medicine when your symptoms get better.  · Keep the affected area clean and dry.  · Wash your hands with soap and warm water before and after touching your skin. Anyone else who touches your skin should also wash his or her hands. Don't share towels.  Follow-up care  Follow up with your healthcare provider, or as advised. If your infection does not go away on the first antibiotic, your healthcare provider will prescribe a different one.  When to seek medical advice  Call your healthcare provider right away if any of these occur:  · Red areas that spread  · Swelling or pain that gets worse  · Fluid leaking from the skin (pus)  · Fever higher of 100.4º F (38.0º C) or higher after 2 days on antibiotics  Date Last Reviewed: 9/1/2016  © 3618-4430 [employer]. [address] [place] [address]. All rights reserved. This information is not intended as a substitute for professional medical care. Always follow your healthcare professional's instructions.

## 2020-05-08 NOTE — PROGRESS NOTES
"Subjective:       Patient ID: Asher Brunner is a 24 y.o. female.    Vitals:  height is 5' 8" (1.727 m) and weight is 52.2 kg (115 lb). Her oral temperature is 99 °F (37.2 °C). Her pulse is 114 (abnormal). Her oxygen saturation is 99%.     Chief Complaint: Edema (right foot swelling - wound on heal)    Patient has wound on her right foot that has been worsening over the past 3-4 days.  She states she 1st noticed a small dry patch that looked like a scab, and she picked at it.  She states that since then it has worsened and developed a blister which popped.  She states that it is painful and has had some swelling.  She denies any calf pain or lower extremity edema.  She stated that wound has had purulent drainage.  She applied tea tree oil to the area.    Edema   This is a new problem. Episode onset: 3 days. The problem occurs constantly. The problem has been gradually worsening. Pertinent negatives include no arthralgias, chills, coughing, fever, joint swelling, rash or sore throat. The symptoms are aggravated by walking and standing. Treatments tried: Tea tree oil. The treatment provided no relief.       Constitution: Negative for chills and fever.   HENT: Negative for facial swelling and sore throat.    Neck: Negative for painful lymph nodes.   Eyes: Negative for eye itching and eyelid swelling.   Respiratory: Negative for cough.    Musculoskeletal: Positive for pain. Negative for joint pain and joint swelling.   Skin: Positive for wound. Negative for color change, pale, rash, abrasion, laceration, lesion, skin thickening/induration, puncture wound, erythema, bruising, abscess, avulsion and hives.   Allergic/Immunologic: Negative for environmental allergies, immunocompromised state and hives.   Hematologic/Lymphatic: Negative for swollen lymph nodes.       Objective:      Physical Exam   Constitutional: She is oriented to person, place, and time. She appears well-developed and well-nourished. She is cooperative. "  Non-toxic appearance. She does not have a sickly appearance. She does not appear ill. No distress.   Patient very pleasant sitting comfortably in no acute distress.  Has marijuana odor.  Patient ambulates without a limp.   HENT:   Head: Normocephalic and atraumatic.   Right Ear: Hearing, tympanic membrane, external ear and ear canal normal.   Left Ear: Hearing, tympanic membrane, external ear and ear canal normal.   Nose: Nose normal. No mucosal edema, rhinorrhea or nasal deformity. No epistaxis. Right sinus exhibits no maxillary sinus tenderness and no frontal sinus tenderness. Left sinus exhibits no maxillary sinus tenderness and no frontal sinus tenderness.   Mouth/Throat: Uvula is midline, oropharynx is clear and moist and mucous membranes are normal. No trismus in the jaw. Normal dentition. No uvula swelling. No oropharyngeal exudate, posterior oropharyngeal edema or posterior oropharyngeal erythema.   Eyes: Conjunctivae and lids are normal. No scleral icterus.   Neck: Trachea normal, full passive range of motion without pain and phonation normal. Neck supple. No neck rigidity. No edema and no erythema present.   Cardiovascular: Normal rate, regular rhythm, normal heart sounds, intact distal pulses and normal pulses.   Pulmonary/Chest: Effort normal and breath sounds normal. No respiratory distress. She has no decreased breath sounds. She has no rhonchi.   Abdominal: Normal appearance.   Musculoskeletal: Normal range of motion. She exhibits no edema or deformity.        Right ankle: Normal.        Right foot: There is tenderness. There is normal range of motion, no swelling, no deformity and no laceration.        Feet:    Neurological: She is alert and oriented to person, place, and time. She exhibits normal muscle tone. Coordination normal.   Skin: Skin is warm, dry, intact, not diaphoretic and not pale. not right footerythema  Psychiatric: She has a normal mood and affect. Her speech is normal and behavior  is normal. Judgment and thought content normal. Cognition and memory are normal.   Nursing note and vitals reviewed.            Instructed to monitor closely, seek medical attention if she develops new or worsening symptoms.  Patient was given wound care instructions.    Assessment:       1. Cellulitis of right foot    2. Infected ulcer of skin, unspecified ulcer stage        Plan:         Cellulitis of right foot  -     clindamycin (CLEOCIN) 300 MG capsule; Take 1 capsule (300 mg total) by mouth 4 (four) times daily. for 7 days  Dispense: 28 capsule; Refill: 0  -     mupirocin (BACTROBAN) 2 % ointment; Apply to affected area 3 times daily  Dispense: 22 g; Refill: 0    Infected ulcer of skin, unspecified ulcer stage  -     clindamycin (CLEOCIN) 300 MG capsule; Take 1 capsule (300 mg total) by mouth 4 (four) times daily. for 7 days  Dispense: 28 capsule; Refill: 0  -     mupirocin (BACTROBAN) 2 % ointment; Apply to affected area 3 times daily  Dispense: 22 g; Refill: 0      Patient Instructions     - Rest.    - Drink plenty of fluids.    - Acetaminophen (tylenol) or Ibuprofen (advil,motrin) as directed as needed for fever/pain. Avoid tylenol if you have a history of liver disease. Do not take ibuprofen if you have a history of GI bleeding, kidney disease, or if you take blood thinners.     - Keep the wound clean. You can wash gently with soap and water.   - Change dressing frequently; apply mupirocin antibiotic ointment as directed.  - No soaking the wound (No swimming, no bath, no dishwashing).    - You have been given an antibiotic (clindamycin) to treat your condition today.    - Please complete the antibiotic as directed on the bottle.   - If you are female and on oral birth control pills, use additional methods to prevent pregnancy while on antibiotics and for one cycle after.   - you can take over-the-counter probiotics during and after antibiotic use to help preserve gut mathew and reduce gastrointestinal  symptoms  -Please take (clindamycin) with food as this medication may cause upset stomach    - Follow up with your PCP or specialty clinic as directed in the next 1-2 weeks if not improved or as needed.  You can call [phone] to schedule an appointment with the appropriate provider.    - Go to the ER or seek medical attention immediately if you develop new or worsening symptoms.    - You must understand that you have received an Urgent Care treatment only and that you may be released before all of your medical problems are known or treated.   - You, the patient, will arrange for follow up care as instructed.   - If your condition worsens or fails to improve we recommend that you receive another evaluation at the ER immediately or contact your PCP to discuss your concerns or return here.       Discharge Instructions for Cellulitis  You have been diagnosed with cellulitis. This is an infection in the deepest layer of the skin. In some cases, the infection also affects the muscle. Cellulitis is caused by bacteria. The bacteria can enter the body through broken skin. This can happen with a cut, scratch, animal bite, or an insect bite that has been scratched. You may have been treated in the hospital with antibiotics and fluids. You will likely be given a prescription for antibiotics to take at home. This sheet will help you take care of yourself at home.  Home care  When you are home:  · Take the prescribed antibiotic medicine you are given as directed until it is gone. Take it even if you feel better. It treats the infection and stops it from returning. Not taking all the medicine can make future infections hard to treat.  · Keep the infected area clean.  · When possible, raise the infected area above the level of your heart. This helps keep swelling down.  · Talk with your healthcare provider if you are in pain. Ask what kind of over-the-counter medicine you can take for pain.  · Apply clean bandages as  advised.  · Take your temperature once a day for a week.  · Wash your hands often to prevent spreading the infection.  In the future, wash your hands before and after you touch cuts, scratches, or bandages. This will help prevent infection.   When to call your healthcare provider  Call your healthcare provider immediately if you have any of the following:  · Difficulty or pain when moving the joints above or below the infected area  · Discharge or pus draining from the area  · Fever of 100.4°F (38°C) or higher, or as directed by your healthcare provider  · Pain that gets worse in or around the infected   · Redness that gets worse in or around the infected area, particularly if the area of redness expands to a wider area  · Shaking chills  · Swelling of the infected area  · Vomiting   Date Last Reviewed: 8/1/2016  © 7814-5611 [employer]. [address] [place] [address]. All rights reserved. This information is not intended as a substitute for professional medical care. Always follow your healthcare professional's instructions.        Wound Check, Infection  You have a wound that has become infected. The wound will not heal properly unless the infection is cleared. Infection in a wound may also spread if it is not treated. In most cases, antibiotic medicines are prescribed to treat a wound infection.   Symptoms of a wound infection include:  · Redness or swelling around the wound  · Warmth coming from the wound  · New or worsening pain  · Red streaks around the wound  · Draining pus  · Fever  Home care  Follow all directions you are given to treat the infection.  Medicines  Take all medicines as prescribed.   · If you were given antibiotics, take them until they are gone or your healthcare provider tells you to stop. It is vital to finish the antibiotics even if you feel better. If you do not finish them, the infection may come back and be harder to treat.  · If your infection is not  responding to the medicines you are taking, you may be prescribed new medicines.  · Take medicine for pain as directed by your healthcare provider.  Wound care  Care for your wound as directed by your healthcare provider.  · Apply a warm compress (clean cloth soaked in hot water) to the infected area for about 5 to 10 minutes at a time. Be very careful not to burn yourself. Test the cloth on a non-infected area to make sure it is not too hot.  · Continue to change the dressing daily. If it becomes wet, stained with wound fluid, or dirty, change it sooner. To change it:  ¨ Wash your hands with soap and water before changing the dressing.  ¨ Carefully remove the dressing and tape. If it sticks to the wound, you may need to wet it a little to remove it. (Do not do this if your healthcare provider has told you not to.)  ¨ Gently clean the wound with clean water (or saline) using gauze, a clean washcloth, or cotton swab.  ¨ Do not use soap, alcohol, peroxide or other cleansers.  ¨ If you were told to dry the wound before putting on a new dressing, gently pat. Do not rub.  ¨ Throw out the old dressing.  ¨ Wash your hands again before opening the new, clean dressing.  ¨ Wash your hands again when you are done.  Follow-up care  Follow up with your healthcare provider as advised. If a culture was done, you will be notified if your treatment needs to change. Call as directed for the results.  When to seek medical advice  Call your health care provider right away if any of these occur:  · Symptoms of infection don't start to improve within 2 days of starting antibiotics  · Symptoms of infection get worse  · New symptoms, such as red streaks around the wound  · Fever of 100.4°F (38.0°C) or higher for more than 2 days after starting the antibiotics  Date Last Reviewed: 8/10/2015  © 6991-4418 [employer]. [address] [place] [address], [place] [address]. All rights reserved. This information is not intended as a substitute  for professional medical care. Always follow your healthcare professional's instructions.        Cellulitis  Cellulitis is an infection of the deep layers of skin. A break in the skin, such as a cut or scratch, can let bacteria under the skin. If the bacteria get to deep layers of the skin, it can be serious. If not treated, cellulitis can get into the bloodstream and lymph nodes. The infection can then spread throughout the body. This causes serious illness.  Cellulitis causes the affected skin to become red, swollen, warm, and sore. The reddened areas have a visible border. An open sore may leak fluid (pus). You may have a fever, chills, and pain.  Cellulitis is treated with antibiotics taken for 7 to 10 days. An open sore may be cleaned and covered with cool wet gauze. Symptoms should get better 1 to 2 days after treatment is started. Make sure to take all the antibiotics for the full number of days until they are gone. Keep taking the medicine even if your symptoms go away.  Home care  Follow these tips:  · Limit the use of the part of your body with cellulitis.   · If the infection is on your leg, keep your leg raised while sitting. This will help to reduce swelling.  · Take all of the antibiotic medicine exactly as directed until it is gone. Do not miss any doses, especially during the first 7 days. Dont stop taking the medicine when your symptoms get better.  · Keep the affected area clean and dry.  · Wash your hands with soap and warm water before and after touching your skin. Anyone else who touches your skin should also wash his or her hands. Don't share towels.  Follow-up care  Follow up with your healthcare provider, or as advised. If your infection does not go away on the first antibiotic, your healthcare provider will prescribe a different one.  When to seek medical advice  Call your healthcare provider right away if any of these occur:  · Red areas that spread  · Swelling or pain that gets  worse  · Fluid leaking from the skin (pus)  · Fever higher of 100.4º F (38.0º C) or higher after 2 days on antibiotics  Date Last Reviewed: 9/1/2016  © 1266-8368 [employer]. [address] [place] [address], [place] [address]. All rights reserved. This information is not intended as a substitute for professional medical care. Always follow your healthcare professional's instructions.

## 2020-05-11 ENCOUNTER — TELEPHONE (OUTPATIENT)
Dept: URGENT CARE | Facility: CLINIC | Age: 25
End: 2020-05-11

## 2020-05-11 NOTE — TELEPHONE ENCOUNTER
Spoke with Dr. Whitehead. Patient is to stop using cream at this time. Patient advised to use Benadryl oral. Patient advised to go to ER if Shortness of breath, or symptoms worsen with patient. She is also advised to come in to be re-evaluated if her hives do not improve.

## 2020-05-11 NOTE — TELEPHONE ENCOUNTER
Spoke with patient she is stating she started the cream and now she has hives from it. Call given to provider.

## 2020-05-12 ENCOUNTER — OFFICE VISIT (OUTPATIENT)
Dept: URGENT CARE | Facility: CLINIC | Age: 25
End: 2020-05-12
Payer: MEDICAID

## 2020-05-12 VITALS
OXYGEN SATURATION: 98 % | BODY MASS INDEX: 17.43 KG/M2 | WEIGHT: 115 LBS | TEMPERATURE: 99 F | HEART RATE: 114 BPM | HEIGHT: 68 IN

## 2020-05-12 DIAGNOSIS — R21 RASH: Primary | ICD-10-CM

## 2020-05-12 PROCEDURE — 99213 OFFICE O/P EST LOW 20 MIN: CPT | Mod: S$GLB,,, | Performed by: NURSE PRACTITIONER

## 2020-05-12 PROCEDURE — 99213 PR OFFICE/OUTPT VISIT, EST, LEVL III, 20-29 MIN: ICD-10-PCS | Mod: S$GLB,,, | Performed by: NURSE PRACTITIONER

## 2020-05-12 RX ORDER — TRIAMCINOLONE ACETONIDE 1 MG/G
OINTMENT TOPICAL 2 TIMES DAILY
Qty: 30 G | Refills: 0 | Status: SHIPPED | OUTPATIENT
Start: 2020-05-12

## 2020-05-12 NOTE — PATIENT INSTRUCTIONS
Please drink plenty of fluids. Please get plenty of rest.    Please return here or go to the Emergency Department for any concerns or worsening of condition.    Please take over the counter Pepcid or Zantac as directed for the next 24-72hours as needed.    If you were given a steroid shot in the clinic and have also been given a prescription for a steroid such as Prednisone or a Medrol Dose Pack, please begin taking them tomorrow. If you received a steroid shot today - this can elevate your blood pressure, elevate your blood sugar, water weight gain, nervous energy, redness to the face and dimpling of the skin where the shot goes in.     If you have a localized reaction it is ok to apply OTC  topical creams (e.g. Cortaid) as directed to the affected area. You can also use a cool compress to reduce itching.     Please take Claritin or Zyrtec or Allegra (24 hours) twice a day.  You can add Benadryl or Hydroxyzine as needed for itching, however these may make you drowsy, so do not  drive or operate heavy equipment or machinery while taking these medications.    FOLLOW UP WITH DERMATOLOGY IF NOT IMPROVING                   Self-Care for Skin Rashes     Pat your skin dry. Do not rub.     When your skin reacts to a substance your body is sensitive to, it can cause a rash. You can treat most rashes at home by keeping the skin clean and dry. Many rashes may get better on their own within 2 to 3 days. You may need medical attention if your rash itches, drains, or hurts, particularly if the rash is getting worse.  What can cause a skin rash?  · Sun poisoning, caused by too much exposure to the sun  · An irritant or allergic reaction to a certain type of food, plant, or chemical, such as  shellfish, poison ivy, and or cleaning products  · An infection caused by a fungus (ringworm), virus (chickenpox), or bacteria (strep)  · Bites or infestation caused by insects or pests, such as ticks, lice, or mites  · Dry skin, which is  often seen during the winter months and in older people  How can I control itching and skin damage?  · Take soothing lukewarm baths in a colloidal oatmeal product. You can buy this at the [employer].  · Do your best not to scratch. Clip fingernails short, especially in young children, to reduce skin damage if scratching does occur.  · Use moisturizing skin lotion instead of scratching your dry skin.  · Use sunscreen whenever going out into direct sun.  · Use only mild cleansing agents whenever possible.  · Wash with mild, nonirritating soap and warm water.  · Wear clothing that breathes, such as cotton shirts or canvas shoes.  · If fluid is seeping from the rash, cover it loosely with clean gauze to absorb the discharge.  · Many rashes are contagious. Prevent the rash from spreading to others by washing your hands often before or after touching others with any skin rash.  Use medicine  · Antihistamines such as diphenhydramine can help control itching. But use with caution because they can make you drowsy.  · Using over-the-counter hydrocortisone cream on small rashes may help reduce swelling and itching  · Most over-the-counter antifungal medicines can treat athletes foot and many other fungal infections of the skin.  Check with your healthcare provider  Call your healthcare provider if:  · You were told that you have a fungal infection on your skin to make sure you have the correct type of medicine.  · You have questions or concerns about medicines or their side effects.     Call 911  Call 911 if either of these occur:  · Your tongue or lips start to swell  · You have difficulty breathing      Call your healthcare provider  Call your healthcare provider if any of these occur:  · Temperature of more than 101.0°F (38.3°C), or as directed  · Sore throat, a cough, or unusual fatigue  · Red, oozy, or painful rash gets worse. These are signs of infection.  · Rash covers your face, genitals, or most of your body  · Crusty  sores or red rings that begin to spread  · You were exposed to someone who has a contagious rash, such as scabies or lice.  · Red bulls-eye rash with a white center (a sign of Lyme disease)  · You were told that you have resistant bacteria (MRSA) on your skin.   Date Last Reviewed: 5/12/2015  © 8010-7730 [employer]. [address] [place] [address]. All rights reserved. This information is not intended as a substitute for professional medical care. Always follow your healthcare professional's instructions.

## 2020-05-14 ENCOUNTER — HOSPITAL ENCOUNTER (EMERGENCY)
Facility: HOSPITAL | Age: 25
Discharge: HOME | End: 2020-05-14
Attending: EMERGENCY MEDICINE
Payer: MEDICAID

## 2020-05-14 VITALS
WEIGHT: 115 LBS | DIASTOLIC BLOOD PRESSURE: 69 MMHG | OXYGEN SATURATION: 100 % | TEMPERATURE: 98 F | HEIGHT: 68 IN | BODY MASS INDEX: 17.43 KG/M2 | RESPIRATION RATE: 20 BRPM | SYSTOLIC BLOOD PRESSURE: 107 MMHG | HEART RATE: 96 BPM

## 2020-05-14 DIAGNOSIS — R21 RASH AND NONSPECIFIC SKIN ERUPTION: Primary | ICD-10-CM

## 2020-05-14 PROCEDURE — 99283 EMERGENCY DEPT VISIT LOW MDM: CPT

## 2020-05-14 NOTE — ED NOTES
APPEARANCE: Alert, oriented and in no acute distress.  CARDIAC: Normal rate and rhythm, no murmur heard.   PERIPHERAL VASCULAR: peripheral pulses present. Normal cap refill. No edema. Warm to touch.    RESPIRATORY:Normal rate and effort, breath sounds clear bilaterally throughout chest. Respirations are equal and unlabored no obvious signs of distress.  GASTRO: soft, bowel sounds normal, no tenderness, no abdominal distention.  MUSC: Full ROM. No bony tenderness or soft tissue tenderness. No obvious deformity.  SKIN: Skin is warm and dry, normal skin turgor, mucous membranes moist.  NEURO: 5/5 strength major flexors/extensors bilaterally. Sensory intact to light touch bilaterally. [place] coma scale: eyes open spontaneously-4, oriented & converses-5, obeys commands-6. No neurological abnormalities.   MENTAL STATUS: awake, alert and aware of environment.  EYE: PERRL, both eyes: pupils brisk and reactive to light. Normal size.  ENT: EARS: no obvious drainage. NOSE: no active bleeding.   BREAST: symmetrical. No masses. No tenderness.  GENITALIA: Normal external genitalia.

## 2020-05-14 NOTE — ED PROVIDER NOTES
Encounter Date: 5/14/2020       History     Chief Complaint   Patient presents with    Rash     patient has rash on right foot. patient stated it started on her hands then spread to her feet and now legs. patient reports it itches. denies SOB or difficutly breathing.      Patient is a 24-year-old female with no significant medical history presenting to the ED for a rash.  Patient states she was seen at urgent care for a rash on her ankle.  Patient was prescribed antibiotics and we appropriate ointment.  Patient states 2 days later she started with a scattered rash.  Patient describes intermittent itching.  Patient has taken Benadryl for her itching which has resolved the symptoms but it has returned.  Patient was seen a 2nd time at urgent care and advised to stop the antibiotics and start with Kenalog ointment.  Patient states she was advised she has eczema.  Patient has not use ointment as prescribed.  Patient states rash has continued.  No shortness of breath or difficulty breathing with associated rash.        Review of patient's allergies indicates:   Allergen Reactions    Shellfish containing products Hives     crabmeat    Codeine Rash     No past medical history on file.  Past Surgical History:   Procedure Laterality Date    APPENDECTOMY      DIAGNOSTIC LAPAROSCOPY  12/1/2019    Procedure: LAPAROSCOPY, DIAGNOSTIC;  Surgeon: Delroy Chase MD;  Location: [facility] OR;  Service: OB/GYN;;    LAPAROSCOPIC APPENDECTOMY N/A 12/30/2018    Procedure: APPENDECTOMY, LAPAROSCOPIC;  Surgeon: Dmitry Navarro Jr., MD;  Location: [facility] OR;  Service: General;  Laterality: N/A;    LAPAROSCOPIC TREATMENT OF ECTOPIC PREGNANCY Left 12/1/2019    Procedure: REMOVAL, ECTOPIC PREGNANCY, LAPAROSCOPIC;  Surgeon: Delroy Chase MD;  Location: [facility] OR;  Service: OB/GYN;  Laterality: Left;     Family History   Problem Relation Age of Onset    No Known Problems Mother     No Known Problems Father      Social History     Tobacco Use     Smoking status: Current Some Day Smoker    Smokeless tobacco: Never Used    Tobacco comment: Patient reports 1-2 cigarettes/day   Substance Use Topics    Alcohol use: No    Drug use: Yes     Types: Marijuana     Comment: Denies current marijuana use     Review of Systems   Constitutional: Negative for fever.   HENT: Negative for sore throat.    Respiratory: Negative for shortness of breath.    Cardiovascular: Negative for chest pain.   Gastrointestinal: Negative for nausea.   Genitourinary: Negative for dysuria.   Musculoskeletal: Negative for arthralgias, back pain, gait problem and myalgias.   Skin: Positive for color change ( right ankle) and rash ( scattered). Negative for pallor.   Neurological: Negative for weakness.   Hematological: Does not bruise/bleed easily.   All other systems reviewed and are negative.      Physical Exam     Initial Vitals [05/14/20 1303]   BP Pulse Resp Temp SpO2   107/69 96 20 98.2 °F (36.8 °C) 100 %      MAP       --         Physical Exam    Nursing note and vitals reviewed.  Constitutional: Vital signs are normal. She appears well-developed and well-nourished. She is cooperative. No distress.   HENT:   Head: Normocephalic and atraumatic.   Mouth/Throat: Uvula is midline, oropharynx is clear and moist and mucous membranes are normal.   Eyes: Conjunctivae, EOM and lids are normal. Pupils are equal, round, and reactive to light.   Neck: Trachea normal, normal range of motion and phonation normal. Neck supple.   Cardiovascular: Normal rate, regular rhythm and intact distal pulses.   Pulses:       Radial pulses are 2+ on the right side, and 2+ on the left side.   Pulmonary/Chest: Effort normal and breath sounds normal.   Abdominal: Normal appearance.   Musculoskeletal:        Feet:    Neurological: She is alert and oriented to person, place, and time. She has normal strength. No sensory deficit. GCS eye subscore is 4. GCS verbal subscore is 5. GCS motor subscore is 6.   Skin: Skin is  warm, dry and intact. Capillary refill takes 2 to 3 seconds. Lesion ( right inner ankle) and rash ( scattered) noted. No pallor.         ED Course   Procedures  Labs Reviewed - No data to display       Imaging Results    None          Medical Decision Making:   Initial Assessment:   Emergent evaluation of a 24 year old female presenting to the ED for scattered rash.  Patient was initially seen at urgent care on 5/8 and started on antibiotics and appropriate in ointment patient states 2 days later she started with a scattered rash.  Patient was seen at urgent care a 2nd time and advised to stop this treatment EN use Kenalog ointment.  Patient states she was advised she has eczema.  Patient states she has not use ointment due to not bleeding this is eczema.  Patient states she is here today due to rash being annoying and wanting to know why she has it.  On exam patient is A&O x3.  Vital signs stable.  Not febrile and nontoxic appearing.  Lesion noted to right inner ankle.  When compared to photo EN chart wound is healing appropriately.  Dry flaky skin noted.  No redness, erythema, or discharge noted.  No warmth appreciated.  Patient does have scattered rash noted to bilateral wrists and arms.  No worrisome signs of rash.  Patient states itching resolved with Benadryl.  There are no worrisome features or systemic symptoms associated with this rash.     Differential Diagnosis:   Differential diagnoses include but are not limited to dermaphyte infestation, atopic dermatitis, allergic dermatitis, eczema, psoriasis or drug reaction.    ED Management:  I do not feel labs or imaging are pertinent for the care this patient.      Patient advised to continue to use Kenalog ointment.  Keep area clean and dry.  Advised to take Benadryl or Benadryl ointment for itching.  Follow up with PCP if symptoms not resolving.  Advised patient she may need derm referral that she can obtain from her PCP.  Patient verbalized understanding of  this plan of care.  All questions and concerns addressed.    Patient is hemodynamically stable, vital signs are normal. Discharge instructions given. Return to ED precautions discussed. Follow up as directed. Pt verbalized understanding of this plan.  Pt is stable for discharge.                                  Clinical Impression:       ICD-10-CM ICD-9-CM   1. Rash and nonspecific skin eruption R21 782.1         Disposition:   Disposition: Discharged  Condition: Stable     ED Disposition Condition    Discharge Stable        ED Prescriptions     None        Follow-up Information     Follow up With Specialties Details Why Contact Info    [facility] Child and Adolescent Psychiatry, Psychology, Family Medicine, Obstetrics Schedule an appointment as soon as possible for a visit in 1 week  [address] [facility]  [address]  [place] [address]  [phone]                                       Minerva Salinas NP  05/14/20 5246

## 2020-05-14 NOTE — DISCHARGE INSTRUCTIONS
Please schedule an appointment with her primary doctor for possible dermatology referral.  Continue to use the Kenalog ointment to promote healing.  You can continue to take Benadryl or buy over-the-counter Benadryl ointment to help with itching.    Our goal in the emergency department is to always give you outstanding care and exceptional service. You may receive a survey by mail or e-mail in the next week regarding your experience in our ED. We would greatly appreciate your completing and returning the survey. Your feedback provides us with a way to recognize our staff who give very good care and it helps us learn how to improve when your experience was below our aspiration of excellence.

## 2020-05-15 ENCOUNTER — TELEPHONE (OUTPATIENT)
Dept: URGENT CARE | Facility: CLINIC | Age: 25
End: 2020-05-15

## 2020-06-01 ENCOUNTER — PATIENT OUTREACH (OUTPATIENT)
Dept: EMERGENCY MEDICINE | Facility: HOSPITAL | Age: 25
End: 2020-06-01

## 2020-06-01 NOTE — PROGRESS NOTES
Shantelle Borden MA  ED Navigator  Emergency Department    Project: [facility] ED Navigator  Role: ED Navigator    Date: 06/01/2020  Patient Name: sAher Brunner  MRN: [id number]  PCP: Primary Doctor No    Assessment:     Asher Brunner is a 24 y.o. female who has presented to ED for No chief complaint on file.  . Patient has visited the ED 1 times in the past 3 months. Patient did not contact PCP.     ED Navigator Patient Assessment    Consent to Services  Does patient consent to completing the assessment?:  Yes  Transportation  Does the patient have issues with Transportation?:  No  Insurance Coverage  Do you have coverage/adequate coverage?:  Yes  Type/kind of coverage:  Medicaid  Is patient able to afford co-pays/deductibles?:  Yes  Is patient able to afford HME or supplies?:  Yes  Does patient have an established [facility] PCP?:  No  Does patient need assistance finding a PCP?:  Yes  Specialist Appointment  Did the patient come to the ED to see a specialist?:  No  Does the patient have a pending specialist referral?:  No  Does the patient have a specialist appointment made?:  No  PCP Follow Up Appointment  Does the patient have a follow up appontment with their PCP?:  No  Why does the patient not have a follow up scheduled?:  No established [facility]/outside PCP  Would you like an [facility] PCP?:  No  Medications  Is patient able to afford medication?:  Yes  Is patient unable to get medication due to lack of transportation?:  No  Psychological  Does the patient have psycho-social concerns?:  No  Food  Does the patient have concerns about food?:  Yes  What concerns does the patient have regarding food?:  Lack of food  Communication/Education  Does the patient have limited English proficiency/English not primary language?:  No  Does patient have low literacy and/or low health literacy?:  No  Does patient have concerns with care?:  No  Does patient have dissatisfaction with care?:  No  Other Financial Concers  Does the  patient have immediate financial distress?:  No  Does the patient have general financial concerns?:  Yes  Other Social Barriers/Concerns  Does the patient have any additional barriers or concerns?:  Unable to afford utilities         Social History     Socioeconomic History    Marital status: Single     Spouse name: Not on file    Number of children: Not on file    Years of education: Not on file    Highest education level: Not on file   Occupational History    Not on file   Social Needs    Financial resource strain: Somewhat hard    Food insecurity:     Worry: Patient refused     Inability: Patient refused    Transportation needs:     Medical: Patient refused     Non-medical: Patient refused   Tobacco Use    Smoking status: Current Some Day Smoker    Smokeless tobacco: Never Used    Tobacco comment: Patient reports 1-2 cigarettes/day   Substance and Sexual Activity    Alcohol use: No    Drug use: Yes     Types: Marijuana     Comment: Denies current marijuana use    Sexual activity: Yes     Partners: Male   Lifestyle    Physical activity:     Days per week: 0 days     Minutes per session: 0 min    Stress: Only a little   Relationships    Social connections:     Talks on phone: Patient refused     Gets together: Patient refused     Attends Gnosticism service: Never     Active member of club or organization: No     Attends meetings of clubs or organizations: Never     Relationship status: Never    Other Topics Concern    Not on file   Social History Narrative    Not on file       Plan:   Pt applied for SNAP benefits and is waiting on her card.  Pt does not have a PCP.  Provided [facility], , [employer] resource, [facility] 24/7 Nurse triage line, [phone] or 1-866-[facility] ([phone]), utility assistance and food bank resources.

## 2021-02-27 ENCOUNTER — OFFICE VISIT (OUTPATIENT)
Dept: URGENT CARE | Facility: CLINIC | Age: 26
End: 2021-02-27
Payer: MEDICAID

## 2021-02-27 VITALS
HEIGHT: 68 IN | RESPIRATION RATE: 18 BRPM | HEART RATE: 88 BPM | SYSTOLIC BLOOD PRESSURE: 104 MMHG | DIASTOLIC BLOOD PRESSURE: 69 MMHG | TEMPERATURE: 98 F | OXYGEN SATURATION: 98 % | BODY MASS INDEX: 17.43 KG/M2 | WEIGHT: 115 LBS

## 2021-02-27 DIAGNOSIS — A60.04 HERPES SIMPLEX VULVOVAGINITIS: Primary | ICD-10-CM

## 2021-02-27 PROCEDURE — 99214 OFFICE O/P EST MOD 30 MIN: CPT | Mod: S$GLB,,, | Performed by: FAMILY MEDICINE

## 2021-02-27 PROCEDURE — 99214 PR OFFICE/OUTPT VISIT, EST, LEVL IV, 30-39 MIN: ICD-10-PCS | Mod: S$GLB,,, | Performed by: FAMILY MEDICINE

## 2021-02-27 RX ORDER — VALACYCLOVIR HYDROCHLORIDE 1 G/1
1000 TABLET, FILM COATED ORAL EVERY 12 HOURS
Qty: 14 TABLET | Refills: 0 | Status: SHIPPED | OUTPATIENT
Start: 2021-02-27 | End: 2021-03-06

## 2021-04-15 ENCOUNTER — PATIENT MESSAGE (OUTPATIENT)
Dept: RESEARCH | Facility: HOSPITAL | Age: 26
End: 2021-04-15

## (undated) DEVICE — NDL 22GA X1 1/2 REG BEVEL

## (undated) DEVICE — GLOVE PROTEXIS HYDROGEL SZ7

## (undated) DEVICE — CLOSURE SKIN STERI STRIP 1/2X4

## (undated) DEVICE — TRAY FOLEY 16FR INFECTION CONT

## (undated) DEVICE — SEE MEDLINE ITEM 154981

## (undated) DEVICE — MANIFOLD 4 PORT

## (undated) DEVICE — SUT 3/0 18IN COATED VICRYLP

## (undated) DEVICE — SUT VICRYL PLUS 4-0 PS2 27

## (undated) DEVICE — GLOVE SURGICAL LATEX SZ 7

## (undated) DEVICE — TROCAR ENDOPATH XCEL 11MM 10CM

## (undated) DEVICE — KIT ANTIFOG

## (undated) DEVICE — ELECTRODE REM PLYHSV RETURN 9

## (undated) DEVICE — SEE MEDLINE ITEM 156952

## (undated) DEVICE — SUT VICRYL 3-0 27 SH

## (undated) DEVICE — Device

## (undated) DEVICE — NDL INSUF ULTRA VERESS 120MM

## (undated) DEVICE — ADHESIVE DERMABOND ADVANCED

## (undated) DEVICE — SOL NS 1000CC

## (undated) DEVICE — APPLICATOR CHLORAPREP ORN 26ML

## (undated) DEVICE — DEVICE N-SEAL LAPROSCOPIC

## (undated) DEVICE — SUPPORT ULNA NERVE PROTECTOR

## (undated) DEVICE — SUT 0 VICRYL / UR6 (J603)

## (undated) DEVICE — PACK LAPAROSCOPY/PELVISCOPY II

## (undated) DEVICE — SOL 9P NACL IRR PIC IL

## (undated) DEVICE — BLANKET UPPER BODY 78.7X29.9IN

## (undated) DEVICE — STAPLER INT LINEAR ARTC 3.5-45

## (undated) DEVICE — SUT MONOCRYL 4-0 PS-2

## (undated) DEVICE — SEE MEDLINE ITEM 157117

## (undated) DEVICE — SEE L#152161

## (undated) DEVICE — CART STAPLE FLEX ETX 3.5MM BLU

## (undated) DEVICE — CART STAPLE RELD 45MM WHT

## (undated) DEVICE — GLOVE 7.5 PROTEXIS PI BLUE

## (undated) DEVICE — BAG TISS RETRV MONARCH 10MM

## (undated) DEVICE — SYR 10CC LUER LOCK

## (undated) DEVICE — SOL CLEARIFY VISUALIZATION LAP

## (undated) DEVICE — SEE MEDLINE ITEM 157181

## (undated) DEVICE — TUBING INSUFFLATION 10

## (undated) DEVICE — PAD PREP 50/CA

## (undated) DEVICE — DRESSING ADH ISLAND 2.5 X 3

## (undated) DEVICE — TROCAR ENDOPATH XCEL 12MM 10CM

## (undated) DEVICE — TROCAR ENDOPATH XCEL 5MM 7.5CM

## (undated) DEVICE — IRRIGATOR ENDOSCOPY DISP.

## (undated) DEVICE — SEE MEDLINE ITEM 146372

## (undated) DEVICE — TROCAR ENDOPATH XCEL 5X75MM

## (undated) DEVICE — CATH SELF-CATH FEMALE 14FR 6IN